# Patient Record
Sex: MALE | Race: WHITE | NOT HISPANIC OR LATINO | Employment: OTHER | RURAL
[De-identification: names, ages, dates, MRNs, and addresses within clinical notes are randomized per-mention and may not be internally consistent; named-entity substitution may affect disease eponyms.]

---

## 2020-07-22 ENCOUNTER — HISTORICAL (OUTPATIENT)
Dept: ADMINISTRATIVE | Facility: HOSPITAL | Age: 60
End: 2020-07-22

## 2020-07-22 LAB
EST. AVERAGE GLUCOSE BLD GHB EST-MCNC: 160 MG/DL
HBA1C MFR BLD HPLC: 7.4 % (ref 4.5–6.6)
UREA BREATH TEST QL: NEGATIVE

## 2021-01-14 ENCOUNTER — HISTORICAL (OUTPATIENT)
Dept: ADMINISTRATIVE | Facility: HOSPITAL | Age: 61
End: 2021-01-14

## 2022-02-10 ENCOUNTER — HOSPITAL ENCOUNTER (EMERGENCY)
Facility: HOSPITAL | Age: 62
Discharge: HOME | End: 2022-02-10
Attending: EMERGENCY MEDICINE
Payer: COMMERCIAL

## 2022-02-10 VITALS
OXYGEN SATURATION: 95 % | HEIGHT: 68 IN | RESPIRATION RATE: 11 BRPM | BODY MASS INDEX: 30.62 KG/M2 | HEART RATE: 70 BPM | WEIGHT: 202 LBS | SYSTOLIC BLOOD PRESSURE: 103 MMHG | DIASTOLIC BLOOD PRESSURE: 67 MMHG | TEMPERATURE: 99 F

## 2022-02-10 DIAGNOSIS — R06.02 SHORTNESS OF BREATH: ICD-10-CM

## 2022-02-10 DIAGNOSIS — K80.21 CALCULUS OF GALLBLADDER WITH BILIARY OBSTRUCTION BUT WITHOUT CHOLECYSTITIS: Primary | ICD-10-CM

## 2022-02-10 DIAGNOSIS — R07.9 CHEST PAIN: ICD-10-CM

## 2022-02-10 LAB
ALBUMIN SERPL BCP-MCNC: 4.4 G/DL (ref 3.5–5)
ALBUMIN/GLOB SERPL: 1.1 {RATIO}
ALP SERPL-CCNC: 115 U/L (ref 45–115)
ALT SERPL W P-5'-P-CCNC: 125 U/L (ref 16–61)
AMYLASE SERPL-CCNC: 66 U/L (ref 25–115)
ANION GAP SERPL CALCULATED.3IONS-SCNC: 16 MMOL/L (ref 7–16)
AST SERPL W P-5'-P-CCNC: 47 U/L (ref 15–37)
BASOPHILS # BLD AUTO: 0.03 K/UL (ref 0–0.2)
BASOPHILS NFR BLD AUTO: 0.4 % (ref 0–1)
BILIRUB SERPL-MCNC: 2.3 MG/DL (ref 0–1.2)
BUN SERPL-MCNC: 18 MG/DL (ref 7–18)
BUN/CREAT SERPL: 17 (ref 6–20)
CALCIUM SERPL-MCNC: 9.6 MG/DL (ref 8.5–10.1)
CHLORIDE SERPL-SCNC: 103 MMOL/L (ref 98–107)
CO2 SERPL-SCNC: 23 MMOL/L (ref 21–32)
CREAT SERPL-MCNC: 1.07 MG/DL (ref 0.7–1.3)
D DIMER PPP FEU-MCNC: <0.27 ΜG/ML (ref 0–0.47)
DIFFERENTIAL METHOD BLD: ABNORMAL
EOSINOPHIL # BLD AUTO: 0.01 K/UL (ref 0–0.5)
EOSINOPHIL NFR BLD AUTO: 0.1 % (ref 1–4)
ERYTHROCYTE [DISTWIDTH] IN BLOOD BY AUTOMATED COUNT: 13.1 % (ref 11.5–14.5)
GLOBULIN SER-MCNC: 4.1 G/DL (ref 2–4)
GLUCOSE SERPL-MCNC: 125 MG/DL (ref 74–106)
HCT VFR BLD AUTO: 48.2 % (ref 40–54)
HGB BLD-MCNC: 16.8 G/DL (ref 13.5–18)
IMM GRANULOCYTES # BLD AUTO: 0.03 K/UL (ref 0–0.04)
IMM GRANULOCYTES NFR BLD: 0.4 % (ref 0–0.4)
LIPASE SERPL-CCNC: 156 U/L (ref 73–393)
LYMPHOCYTES # BLD AUTO: 1.11 K/UL (ref 1–4.8)
LYMPHOCYTES NFR BLD AUTO: 14 % (ref 27–41)
MCH RBC QN AUTO: 30.7 PG (ref 27–31)
MCHC RBC AUTO-ENTMCNC: 34.9 G/DL (ref 32–36)
MCV RBC AUTO: 88 FL (ref 80–96)
MONOCYTES # BLD AUTO: 0.39 K/UL (ref 0–0.8)
MONOCYTES NFR BLD AUTO: 4.9 % (ref 2–6)
MPC BLD CALC-MCNC: 9.3 FL (ref 9.4–12.4)
NEUTROPHILS # BLD AUTO: 6.36 K/UL (ref 1.8–7.7)
NEUTROPHILS NFR BLD AUTO: 80.2 % (ref 53–65)
NRBC # BLD AUTO: 0 X10E3/UL
NRBC, AUTO (.00): 0 %
NT-PROBNP SERPL-MCNC: 35 PG/ML (ref 1–125)
PLATELET # BLD AUTO: 180 K/UL (ref 150–400)
POTASSIUM SERPL-SCNC: 4.1 MMOL/L (ref 3.5–5.1)
PROT SERPL-MCNC: 8.5 G/DL (ref 6.4–8.2)
RBC # BLD AUTO: 5.48 M/UL (ref 4.6–6.2)
SODIUM SERPL-SCNC: 138 MMOL/L (ref 136–145)
TROPONIN I SERPL HS-MCNC: 8.5 PG/ML
TROPONIN I SERPL HS-MCNC: 9.2 PG/ML
WBC # BLD AUTO: 7.93 K/UL (ref 4.5–11)

## 2022-02-10 PROCEDURE — 99284 EMERGENCY DEPT VISIT MOD MDM: CPT | Mod: ,,, | Performed by: EMERGENCY MEDICINE

## 2022-02-10 PROCEDURE — 93010 EKG 12-LEAD: ICD-10-PCS | Mod: ,,, | Performed by: INTERNAL MEDICINE

## 2022-02-10 PROCEDURE — 96374 THER/PROPH/DIAG INJ IV PUSH: CPT

## 2022-02-10 PROCEDURE — 25000003 PHARM REV CODE 250

## 2022-02-10 PROCEDURE — 93005 ELECTROCARDIOGRAM TRACING: CPT

## 2022-02-10 PROCEDURE — 99285 EMERGENCY DEPT VISIT HI MDM: CPT | Mod: 25

## 2022-02-10 PROCEDURE — 84075 ASSAY ALKALINE PHOSPHATASE: CPT

## 2022-02-10 PROCEDURE — 84484 ASSAY OF TROPONIN QUANT: CPT

## 2022-02-10 PROCEDURE — 80053 COMPREHEN METABOLIC PANEL: CPT

## 2022-02-10 PROCEDURE — 82040 ASSAY OF SERUM ALBUMIN: CPT

## 2022-02-10 PROCEDURE — 99284 PR EMERGENCY DEPT VISIT,LEVEL IV: ICD-10-PCS | Mod: ,,, | Performed by: EMERGENCY MEDICINE

## 2022-02-10 PROCEDURE — 93010 ELECTROCARDIOGRAM REPORT: CPT | Mod: ,,, | Performed by: INTERNAL MEDICINE

## 2022-02-10 PROCEDURE — 85025 COMPLETE CBC W/AUTO DIFF WBC: CPT

## 2022-02-10 PROCEDURE — 83690 ASSAY OF LIPASE: CPT

## 2022-02-10 PROCEDURE — C9113 INJ PANTOPRAZOLE SODIUM, VIA: HCPCS | Performed by: EMERGENCY MEDICINE

## 2022-02-10 PROCEDURE — 36415 COLL VENOUS BLD VENIPUNCTURE: CPT

## 2022-02-10 PROCEDURE — 82150 ASSAY OF AMYLASE: CPT

## 2022-02-10 PROCEDURE — 63600175 PHARM REV CODE 636 W HCPCS: Performed by: EMERGENCY MEDICINE

## 2022-02-10 PROCEDURE — 83880 ASSAY OF NATRIURETIC PEPTIDE: CPT

## 2022-02-10 PROCEDURE — 85378 FIBRIN DEGRADE SEMIQUANT: CPT

## 2022-02-10 RX ORDER — ASPIRIN 325 MG
TABLET ORAL
Status: COMPLETED
Start: 2022-02-10 | End: 2022-02-10

## 2022-02-10 RX ORDER — MAG HYDROX/ALUMINUM HYD/SIMETH 200-200-20
30 SUSPENSION, ORAL (FINAL DOSE FORM) ORAL ONCE
Status: COMPLETED | OUTPATIENT
Start: 2022-02-10 | End: 2022-02-10

## 2022-02-10 RX ORDER — ASPIRIN 325 MG
325 TABLET ORAL
Status: COMPLETED | OUTPATIENT
Start: 2022-02-10 | End: 2022-02-10

## 2022-02-10 RX ORDER — PANTOPRAZOLE SODIUM 40 MG/10ML
40 INJECTION, POWDER, LYOPHILIZED, FOR SOLUTION INTRAVENOUS
Status: COMPLETED | OUTPATIENT
Start: 2022-02-10 | End: 2022-02-10

## 2022-02-10 RX ORDER — LIDOCAINE HYDROCHLORIDE 20 MG/ML
10 SOLUTION OROPHARYNGEAL ONCE
Status: COMPLETED | OUTPATIENT
Start: 2022-02-10 | End: 2022-02-10

## 2022-02-10 RX ADMIN — LIDOCAINE HYDROCHLORIDE 10 ML: 20 SOLUTION ORAL; TOPICAL at 12:02

## 2022-02-10 RX ADMIN — Medication 325 MG: at 10:02

## 2022-02-10 RX ADMIN — ALUMINUM HYDROXIDE, MAGNESIUM HYDROXIDE, AND SIMETHICONE 30 ML: 200; 200; 20 SUSPENSION ORAL at 12:02

## 2022-02-10 RX ADMIN — ASPIRIN 325 MG ORAL TABLET 325 MG: 325 PILL ORAL at 10:02

## 2022-02-10 RX ADMIN — PANTOPRAZOLE SODIUM 40 MG: 40 INJECTION, POWDER, FOR SOLUTION INTRAVENOUS at 11:02

## 2022-02-10 NOTE — Clinical Note
"Jenaro Machado"Kj was seen and treated in our emergency department on 2/10/2022.  He may return to work on 02/11/2022.       If you have any questions or concerns, please don't hesitate to call.      Albert Covarrubias MD"

## 2022-02-10 NOTE — ED PROVIDER NOTES
"Encounter Date: 2/10/2022       History     Chief Complaint   Patient presents with    Chest Pain     Pt is a 60 yo male who presented today to [facility] ED with a cc of "chest pain." He states that this originally began about 1 month ago but has progressively worsened with the worst episode being last night. The most recent episode occurred while sitting in his recliner last night around 10pm. He describes that pain as a sharp, stabbing pain that he rates at a 9/10. He describes numbness in BUE with radiation to the fingertips bilaterally. The pain is not associated with any known provocation or palliation. He states that he has never experienced pain like this prior.         Review of patient's allergies indicates:  No Known Allergies  Past Medical History:   Diagnosis Date    Hypertension      History reviewed. No pertinent surgical history.  History reviewed. No pertinent family history.  Social History     Tobacco Use    Smoking status: Never Smoker    Smokeless tobacco: Never Used   Substance Use Topics    Alcohol use: Never    Drug use: Never     Review of Systems   Constitutional: Negative for appetite change, chills, diaphoresis, fatigue and fever.   Respiratory: Positive for chest tightness and shortness of breath. Negative for cough and wheezing.    Cardiovascular: Positive for chest pain. Negative for palpitations and leg swelling.   Gastrointestinal: Negative for abdominal distention, abdominal pain, constipation, diarrhea, nausea and vomiting.   Neurological: Positive for numbness. Negative for dizziness, syncope, weakness, light-headedness and headaches.   All other systems reviewed and are negative.      Physical Exam     Initial Vitals [02/10/22 1016]   BP Pulse Resp Temp SpO2   (!) 175/117 101 18 98.7 °F (37.1 °C) 99 %      MAP       --         Physical Exam    Nursing note and vitals reviewed.  Constitutional: He appears well-developed and well-nourished.   HENT:   Head: Normocephalic and " atraumatic.   Eyes: Conjunctivae and EOM are normal. Pupils are equal, round, and reactive to light. No scleral icterus.   Neck: Neck supple.   Normal range of motion.  Cardiovascular: Normal rate, regular rhythm and normal heart sounds. Exam reveals no gallop and no friction rub.    No murmur heard.  Pulmonary/Chest: Breath sounds normal. No respiratory distress. He has no wheezes. He has no rhonchi. He has no rales. He exhibits tenderness.   Abdominal: Abdomen is soft. Bowel sounds are normal. He exhibits no distension. There is no abdominal tenderness. There is no guarding.   Musculoskeletal:         General: No edema. Normal range of motion.      Cervical back: Normal range of motion and neck supple.     Neurological: He is alert and oriented to person, place, and time.   Skin: Skin is warm and dry. Capillary refill takes less than 2 seconds.   Psychiatric: He has a normal mood and affect. His behavior is normal. Judgment and thought content normal.         Medical Screening Exam   See Full Note    ED Course   Procedures  Labs Reviewed   COMPREHENSIVE METABOLIC PANEL - Abnormal; Notable for the following components:       Result Value    Glucose 125 (*)     Total Protein 8.5 (*)     Globulin 4.1 (*)     Bilirubin, Total 2.3 (*)      (*)     AST 47 (*)     All other components within normal limits   CBC WITH DIFFERENTIAL - Abnormal; Notable for the following components:    MPV 9.3 (*)     Neutrophils % 80.2 (*)     Lymphocytes % 14.0 (*)     Eosinophils % 0.1 (*)     All other components within normal limits   TROPONIN I - Normal   TROPONIN I - Normal   NT-PRO NATRIURETIC PEPTIDE - Normal   LIPASE - Normal   AMYLASE - Normal   D DIMER, QUANTITATIVE - Normal   CBC W/ AUTO DIFFERENTIAL    Narrative:     The following orders were created for panel order CBC auto differential.  Procedure                               Abnormality         Status                     ---------                                -----------         ------                     CBC with Differential[[id number]]        Abnormal            Final result                 Please view results for these tests on the individual orders.   EXTRA TUBES    Narrative:     The following orders were created for panel order EXTRA TUBES.  Procedure                               Abnormality         Status                     ---------                               -----------         ------                     Light Blue Top Hold[[id number]]                              In process                 Gold Top Hold[[id number]]                                    In process                   Please view results for these tests on the individual orders.   LIGHT BLUE TOP HOLD   GOLD TOP HOLD        ECG Results          EKG 12-lead (Final result)  Result time 02/10/22 13:38:50    Final result by Interface, Lab In [facility] (02/10/22 13:38:50)                 Narrative:    Test Reason : R07.9,    Vent. Rate : 064 BPM     Atrial Rate : 000 BPM     P-R Int : 216 ms          QRS Dur : 116 ms      QT Int : 430 ms       P-R-T Axes : 026 -58 067 degrees     QTc Int : 437 ms    Sinus rhythm  with borderline 1st degree A-V block  Left anterior fascicular block  Possible lateral infarct - age undetermined  Possible right ventricular hypertrophy  Abnormal ECG    Confirmed by Mandeep GUPTA, Shar ([id number]) on 2/10/2022 1:38:44 PM    Referred By: [email or web address]   SELF           Confirmed By:Shar Kaufman MD                            Imaging Results          MRI MRCP Without Contrast (Final result)  Result time 02/10/22 16:00:25    Final result by Christoph Maya MD (02/10/22 16:00:25)                 Impression:      Cholelithiasis    No evidence to suggest biliary obstruction    No acute process otherwise      Electronically signed by: Christoph Maya  Date:    02/10/2022  Time:    16:00             Narrative:    EXAMINATION:  MRI ABDOMEN WITHOUT CONTRAST MRCP    CLINICAL  HISTORY:  Cholelithiasis;.    Elevated bilirubin    COMPARISON:  Noncontrast CT abdomen February 10, 2022    TECHNIQUE:  The abdomen was imaged in the multiple planes on the 1.5 nancy magnet without IV contrast, to include T2, T1, and diffusion sequences.  MRCP was also performed.  In addition to source images, 3D multi planar reconstruction MRCP images were also generated and archived.    FINDINGS:  Numerous nodular hypointense T2 signal gallstones are noted in the gallbladder lumen.  There is no gallbladder wall thickening or abnormal pericholecystic fluid.  Common bile duct measures a normal 4 mm.  Distal common bile duct tapers smoothly.  There is no choledochal stone.  There is no malignant-appearing biliary stricture.  There is no intrahepatic biliary ductal dilatation.  There is no gross abnormality of the normal caliber pancreatic duct.    Liver, spleen, pancreas, bile ducts, and adrenal glands appear normal.  Multiple scattered punctate rounded fluid signal intensity renal cortical cysts are present, all measuring less than 5 mm.  There is no obvious solid renal mass.  There is no hydronephrosis.    Borderline prominent 10 mm portal caval lymph node and chayo hepatis node are noted.  There is no charmaine lymphadenopathy by short-axis diameter criteria.    There is no charmaine bowel obstruction where seen.    There is no aneurysm of the abdominal aorta.                               CT Abdomen Pelvis  Without Contrast (Final result)  Result time 02/10/22 13:31:52    Final result by Roc Menendez II, MD (02/10/22 13:31:52)                 Impression:      Cholelithiasis.  No other evidence of significant abnormality demonstrated      Electronically signed by: Roc Menendez  Date:    02/10/2022  Time:    13:31             Narrative:    EXAMINATION:  CT ABDOMEN PELVIS WITHOUT CONTRAST    CLINICAL HISTORY:  Gall Stones, RUQ pain;    TECHNIQUE:  Axial CT imaging of the abdomen and pelvis is performed without  contrast.    CT dose reduction technique used - Dose Rite and tube current modulation.    COMPARISON:  None available    FINDINGS:  Cardiac and lung bases are within normal limits    CT abdomen: The gallbladder has multiple calculi present liver, spleen, pancreas and adrenal glands are normal in size and density.  No evidence of focal lesion is demonstrated in these solid organs.    Kidneys are normal in size and density.  No evidence of hydronephrosis or nephrolithiasis is seen.    The bowel caliber is normal and no wall thickening or adjacent inflammatory change is seen.  No evidence of free fluid or free air is present.  Appendix is not identified.    CT pelvis: The bowel and bladder appear within normal limits.  The pelvic organs show no evidence of abnormality                               US Abdomen Complete (Final result)  Result time 02/10/22 12:36:47    Final result by Roc Menendez II, MD (02/10/22 12:36:47)                 Impression:      Cholelithiasis.  No other evidence of abnormality demonstrated.    Ultrasound images stored and captured.      Electronically signed by: Roc Menendez  Date:    02/10/2022  Time:    12:36             Narrative:    EXAMINATION:  US ABDOMEN COMPLETE    CLINICAL HISTORY:  elevated liver enzymes;Abdominal Pain    TECHNIQUE:  Grayscale and color Doppler imaging performed    COMPARISON:  None available    FINDINGS:  The liver is normal in size and echogenicity. The gallbladder has multiple calculi including some in the gallbladder neck which does not move during the study.  The gallbladder wall thickness is 2.4 mm. The common bile duct measures 4.5 mm.    The visualized portion of the pancreas appear within normal limits    The kidneys are normal in size and echogenicity without hydronephrosis or other abnormality. The right renal length is 9.6 cm. Left renal length is 69.9 cm.    Spleen, aorta and IVC appear within normal limits. No free fluid or free air seen.                                X-Ray Chest AP Portable (Final result)  Result time 02/10/22 11:26:57    Final result by Roc Menendez II, MD (02/10/22 11:26:57)                 Impression:      No evidence of cardiopulmonary disease.      Electronically signed by: Roc Menendez  Date:    02/10/2022  Time:    11:26             Narrative:    EXAMINATION:  XR CHEST AP PORTABLE    CLINICAL HISTORY:  Shortness of breath    COMPARISON:  14 January 2021    FINDINGS:  The heart and mediastinum are normal in size and configuration.  The pulmonary vascularity is normal in caliber.  No lung infiltrates, effusions, pneumothorax or other abnormality is demonstrated.                                 Medications   aspirin tablet 325 mg (325 mg Oral Given 2/10/22 1047)   pantoprazole injection 40 mg (40 mg Intravenous Given 2/10/22 1148)   aluminum-magnesium hydroxide-simethicone 200-200-20 mg/5 mL suspension 30 mL (30 mLs Oral Given 2/10/22 1252)     And   LIDOcaine HCl 2% oral solution 10 mL (10 mLs Oral Given 2/10/22 1252)     Medical Decision Making:   ED Management:  MRCP negative.  Dr. Melton will see in clinic Monday.            Attending Attestation:   Physician Attestation Statement for Resident:  As the supervising MD   Physician Attestation Statement: I have personally seen and examined this patient.   I agree with the above history. -:   As the supervising MD I agree with the above PE.    As the supervising MD I agree with the above treatment, course, plan, and disposition.                ED Course as of 02/10/22 1727   Thu Feb 10, 2022   1142 Patient evaluated and managed by attending emergency physician and by resident physician.  Patient presents with epigastric/lower sternal chest discomfort since yesterday.  Patient says that he has had a sour taste in some belching decreased appetite over the past day.  He has been told the past that he has had gallstones but never has had a cholecystectomy.  Does not have  postprandial pain.  Patient was diagnosed with COVID 19 2 weeks ago.  His only symptom was fatigue.  There has been no associated vomiting or diarrhea.  Patient has been on Nexium chronically but feels like it is stopped working.  No prior cardiac workup was.  No associated exertional chest pain no associated diaphoresis.  Physical exam done at 11:35 a.m. shows patient is nontoxic appearing awake and alert normal heart lung sounds.  Moderate epigastric tenderness.  EKG shows no acute ischemic changes.  Troponin is normal with onset of symptoms being yesterday and also morbid GI description of his symptoms negative assess due troponin this time wheals out myocardial infarction.  Bilirubin 2.3 alkaline phosphatase normal transaminases slightly elevated.  Will obtain ultrasound right upper quadrant. [PK]   1415 GI on-call recommends an MRCP done in the ER [PK]      ED Course User Index  [PK] Cristóbal Echevarria MD          Clinical Impression:   Final diagnoses:  [K80.21] Calculus of gallbladder with biliary obstruction but without cholecystitis (Primary)         ED Disposition Condition    Discharge Stable        ED Prescriptions     None        Follow-up Information    None          Albert Covarrubias MD  Resident  02/10/22 1126       Cristóbal Echevarria MD  02/10/22 8870

## 2022-02-22 ENCOUNTER — OFFICE VISIT (OUTPATIENT)
Dept: SURGERY | Facility: CLINIC | Age: 62
End: 2022-02-22
Attending: STUDENT IN AN ORGANIZED HEALTH CARE EDUCATION/TRAINING PROGRAM
Payer: COMMERCIAL

## 2022-02-22 DIAGNOSIS — Z01.812 PRE-PROCEDURAL LABORATORY EXAMINATION: Primary | ICD-10-CM

## 2022-02-22 DIAGNOSIS — K80.20 SYMPTOMATIC CHOLELITHIASIS: ICD-10-CM

## 2022-02-22 PROCEDURE — 99204 PR OFFICE/OUTPT VISIT, NEW, LEVL IV, 45-59 MIN: ICD-10-PCS | Mod: S$PBB,,, | Performed by: STUDENT IN AN ORGANIZED HEALTH CARE EDUCATION/TRAINING PROGRAM

## 2022-02-22 PROCEDURE — 99204 OFFICE O/P NEW MOD 45 MIN: CPT | Mod: S$PBB,,, | Performed by: STUDENT IN AN ORGANIZED HEALTH CARE EDUCATION/TRAINING PROGRAM

## 2022-02-22 PROCEDURE — 1159F PR MEDICATION LIST DOCUMENTED IN MEDICAL RECORD: ICD-10-PCS | Mod: ,,, | Performed by: STUDENT IN AN ORGANIZED HEALTH CARE EDUCATION/TRAINING PROGRAM

## 2022-02-22 PROCEDURE — 99213 OFFICE O/P EST LOW 20 MIN: CPT | Mod: PBBFAC | Performed by: STUDENT IN AN ORGANIZED HEALTH CARE EDUCATION/TRAINING PROGRAM

## 2022-02-22 PROCEDURE — 1159F MED LIST DOCD IN RCRD: CPT | Mod: ,,, | Performed by: STUDENT IN AN ORGANIZED HEALTH CARE EDUCATION/TRAINING PROGRAM

## 2022-02-22 RX ORDER — SODIUM CHLORIDE 9 MG/ML
INJECTION, SOLUTION INTRAVENOUS CONTINUOUS
Status: CANCELLED | OUTPATIENT
Start: 2022-02-22

## 2022-02-22 NOTE — PATIENT INSTRUCTIONS
[facility]                                                                                                                                                                                                                                                                                                                                                                                                                                                                         Preoperative Instructions        Your pre-op lab work and Covid testing will be on 2/22/2022 on the 1st floor of the [facility] building.                                                                                Day of Surgery      Your surgery is scheduled for 2/24/2022 at [facility] on the ground floor of the Ambulatory building.     Your arrival time is 0700.              DO NOT EAT OR DRINK ANYTHING AFTER MIDNIGHT.          You may take blood pressure medication with a small drink of water the morning of surgery.                                 DO NOT TAKE INSULIN OR ANY OTHER BLOOD SUGAR MEDICATIONS.           The following blood sugar medications have to be stopped 48 hours prior to surgery:                    Metformin        Glucovance          Metaglip             Fortamet           Glucophage                   Riomet             Avandamet          Glimepiride              IF YOU ARE ON ANY OF THESE BLOOD THINNERS, MAKE SURE YOUR PHYSICIAN IS AWARE.                Eliquis/Apixaban             Xarelto/Rivaroxaban             Plavix/Clopidogrel                  Wafarin/Coumadin,Jantoven           Pletal/Cilostazol              Pradaxa/Dibigatran                           PLEASE USE CHLORHEXIDINE WASH THE NIGHT BEFORE SURGERY AND THE  MORNING OF SURGERY.             YOU CANNOT DRIVE YOURSELF HOME FROM THE HOSPITAL THE DAY OF SURGERY.        Please have a  with you.           Bring all medications, that you are currently taking, with you to the hospital the morning of your procedure.           Please leave all valuables at home.          Children under the age of 18 must be accompanied by an adult.          PLEASE UNDERSTAND THAT OUR OFFICE DOES NOT GIVE PATHOLOGY RESULTS OR TEST RESULTS OVER THE PHONE.         THIS WILL BE DISCUSSED WITH YOU ON YOUR FOLLOW UP APPOINTMENT TO DISCUSS IN PERSON.

## 2022-02-22 NOTE — H&P (VIEW-ONLY)
History & Physical    SUBJECTIVE:     History of Present Illness:  61-year-old  male presents to the clinic for evaluation for abdominal pain.  Patient states his known history of gallstones.  Patient has had increasing abdominal pain, nausea and vomiting this past Monday which progressively worsened.  Patient went to the ER was found to have gallstones in the neck of the gallbladder with some elevated liver enzymes.  MRCP was performed which showed no choledocholithiasis.  Patient was discharged to follow-up and my clinic.  Patient still has occasional abdominal pain and once gallbladder removed.  Patient the significant past surgical history of umbilical hernia repair with mesh approximately 10-15 years ago and hemorrhoidectomy.  Patient has never had a colonoscopy.  Denies any chest pain/shortness of breath, nausea/vomiting/diarrhea, fever/chills.    Chief Complaint   Patient presents with    Gall Bladder Problem     States he has been having some problems for about 2 years       Review of patient's allergies indicates:  No Known Allergies    No current outpatient medications on file.     No current facility-administered medications for this visit.       Past Medical History:   Diagnosis Date    Hypertension      Past Surgical History:   Procedure Laterality Date    HERNIA REPAIR      umbilical    KNEE SURGERY       History reviewed. No pertinent family history.  Social History     Tobacco Use    Smoking status: Never Smoker    Smokeless tobacco: Never Used   Substance Use Topics    Alcohol use: Never    Drug use: Never        Review of Systems:  Review of Systems   Constitutional: Negative.  Negative for appetite change, chills, fever and unexpected weight change.   HENT: Negative.  Negative for trouble swallowing.    Eyes: Negative.    Respiratory: Negative.  Negative for chest tightness and shortness of breath.    Cardiovascular: Negative.  Negative for chest pain.   Gastrointestinal:  Negative.  Negative for abdominal distention, abdominal pain, blood in stool and nausea.   Endocrine: Negative.    Genitourinary: Negative.  Negative for hematuria.   Musculoskeletal: Negative.  Negative for back pain and myalgias.   Skin: Negative.  Negative for color change.   Allergic/Immunologic: Negative.    Neurological: Negative.  Negative for dizziness, syncope, weakness and light-headedness.   Psychiatric/Behavioral: Negative.  Negative for agitation.       OBJECTIVE:     Vital Signs (Most Recent)              Physical Exam:  Physical Exam  Constitutional:       General: He is not in acute distress.     Appearance: Normal appearance.   HENT:      Head: Normocephalic.      Nose: Nose normal.   Eyes:      Pupils: Pupils are equal, round, and reactive to light.   Cardiovascular:      Rate and Rhythm: Normal rate.   Pulmonary:      Effort: Pulmonary effort is normal. No respiratory distress.   Abdominal:      Palpations: Abdomen is soft.      Tenderness: There is no abdominal tenderness. There is no guarding or rebound.   Musculoskeletal:         General: Normal range of motion.      Cervical back: Normal range of motion.   Skin:     General: Skin is warm.      Coloration: Skin is not jaundiced.   Neurological:      General: No focal deficit present.      Mental Status: He is alert and oriented to person, place, and time.      Cranial Nerves: No cranial nerve deficit.   Psychiatric:         Mood and Affect: Mood normal.         ASSESSMENT/PLAN:     Symptomatic cholelithiasis    PLAN:Plan     To the OR for laparoscopic cholecystectomy.    Risks and benefits explained with the patient including risks for infection, bleeding, injury to surrounding structures, hematoma/seroma formation with need for possible evacuation, possible open.  The patient verbalized understanding, agrees and wishes to proceed with surgery.    Patient will need colonoscopy to be set up after his postoperative follow-up

## 2022-02-23 RX ORDER — DOXYCYCLINE 100 MG/1
CAPSULE ORAL
Status: ON HOLD | COMMUNITY
Start: 2022-01-31 | End: 2022-02-24 | Stop reason: HOSPADM

## 2022-02-23 RX ORDER — PREDNISONE 10 MG/1
TABLET ORAL
Status: ON HOLD | COMMUNITY
Start: 2022-01-31 | End: 2022-02-24 | Stop reason: HOSPADM

## 2022-02-23 RX ORDER — BUDESONIDE 0.5 MG/2ML
INHALANT ORAL
COMMUNITY
Start: 2022-01-31 | End: 2022-02-24

## 2022-02-23 RX ORDER — AZITHROMYCIN 250 MG/1
TABLET, FILM COATED ORAL
Status: ON HOLD | COMMUNITY
Start: 2022-01-26 | End: 2022-02-24 | Stop reason: HOSPADM

## 2022-02-23 RX ORDER — ALBUTEROL SULFATE 0.83 MG/ML
SOLUTION RESPIRATORY (INHALATION)
COMMUNITY
Start: 2022-01-31 | End: 2023-06-25 | Stop reason: CLARIF

## 2022-02-24 ENCOUNTER — HOSPITAL ENCOUNTER (OUTPATIENT)
Facility: HOSPITAL | Age: 62
Discharge: HOME | End: 2022-02-24
Attending: STUDENT IN AN ORGANIZED HEALTH CARE EDUCATION/TRAINING PROGRAM | Admitting: STUDENT IN AN ORGANIZED HEALTH CARE EDUCATION/TRAINING PROGRAM
Payer: COMMERCIAL

## 2022-02-24 ENCOUNTER — ANESTHESIA (OUTPATIENT)
Dept: SURGERY | Facility: HOSPITAL | Age: 62
End: 2022-02-24
Payer: COMMERCIAL

## 2022-02-24 ENCOUNTER — ANESTHESIA EVENT (OUTPATIENT)
Dept: SURGERY | Facility: HOSPITAL | Age: 62
End: 2022-02-24
Payer: COMMERCIAL

## 2022-02-24 VITALS
TEMPERATURE: 98 F | HEART RATE: 68 BPM | SYSTOLIC BLOOD PRESSURE: 120 MMHG | DIASTOLIC BLOOD PRESSURE: 84 MMHG | BODY MASS INDEX: 29.4 KG/M2 | RESPIRATION RATE: 18 BRPM | OXYGEN SATURATION: 95 % | HEIGHT: 68 IN | WEIGHT: 194 LBS

## 2022-02-24 DIAGNOSIS — Z01.812 PRE-PROCEDURAL LABORATORY EXAMINATION: ICD-10-CM

## 2022-02-24 DIAGNOSIS — K80.20 SYMPTOMATIC CHOLELITHIASIS: ICD-10-CM

## 2022-02-24 PROCEDURE — D9220A PRA ANESTHESIA: Mod: ,,, | Performed by: ANESTHESIOLOGY

## 2022-02-24 PROCEDURE — 27000716 HC OXISENSOR PROBE, ANY SIZE: Performed by: ANESTHESIOLOGY

## 2022-02-24 PROCEDURE — 27000689 HC BLADE LARYNGOSCOPE ANY SIZE: Performed by: ANESTHESIOLOGY

## 2022-02-24 PROCEDURE — 25000003 PHARM REV CODE 250: Performed by: NURSE ANESTHETIST, CERTIFIED REGISTERED

## 2022-02-24 PROCEDURE — 27000165 HC TUBE, ETT CUFFED: Performed by: ANESTHESIOLOGY

## 2022-02-24 PROCEDURE — 37000008 HC ANESTHESIA 1ST 15 MINUTES: Performed by: STUDENT IN AN ORGANIZED HEALTH CARE EDUCATION/TRAINING PROGRAM

## 2022-02-24 PROCEDURE — D9220A PRA ANESTHESIA: ICD-10-PCS | Mod: ,,, | Performed by: NURSE ANESTHETIST, CERTIFIED REGISTERED

## 2022-02-24 PROCEDURE — 71000016 HC POSTOP RECOV ADDL HR: Performed by: STUDENT IN AN ORGANIZED HEALTH CARE EDUCATION/TRAINING PROGRAM

## 2022-02-24 PROCEDURE — 25000003 PHARM REV CODE 250: Performed by: ANESTHESIOLOGY

## 2022-02-24 PROCEDURE — 27000510 HC BLANKET BAIR HUGGER ANY SIZE: Performed by: ANESTHESIOLOGY

## 2022-02-24 PROCEDURE — 25000003 PHARM REV CODE 250: Performed by: STUDENT IN AN ORGANIZED HEALTH CARE EDUCATION/TRAINING PROGRAM

## 2022-02-24 PROCEDURE — 47562 PR LAP,CHOLECYSTECTOMY: ICD-10-PCS | Mod: 22,,, | Performed by: STUDENT IN AN ORGANIZED HEALTH CARE EDUCATION/TRAINING PROGRAM

## 2022-02-24 PROCEDURE — 47562 LAPAROSCOPIC CHOLECYSTECTOMY: CPT | Mod: 22,,, | Performed by: STUDENT IN AN ORGANIZED HEALTH CARE EDUCATION/TRAINING PROGRAM

## 2022-02-24 PROCEDURE — 88304 TISSUE EXAM BY PATHOLOGIST: CPT | Mod: SUR | Performed by: STUDENT IN AN ORGANIZED HEALTH CARE EDUCATION/TRAINING PROGRAM

## 2022-02-24 PROCEDURE — 36000709 HC OR TIME LEV III EA ADD 15 MIN: Performed by: STUDENT IN AN ORGANIZED HEALTH CARE EDUCATION/TRAINING PROGRAM

## 2022-02-24 PROCEDURE — 27000655: Performed by: ANESTHESIOLOGY

## 2022-02-24 PROCEDURE — 27000260 *HC AIRWAY ORAL: Performed by: ANESTHESIOLOGY

## 2022-02-24 PROCEDURE — 27201423 OPTIME MED/SURG SUP & DEVICES STERILE SUPPLY: Performed by: STUDENT IN AN ORGANIZED HEALTH CARE EDUCATION/TRAINING PROGRAM

## 2022-02-24 PROCEDURE — D9220A PRA ANESTHESIA: ICD-10-PCS | Mod: ,,, | Performed by: ANESTHESIOLOGY

## 2022-02-24 PROCEDURE — D9220A PRA ANESTHESIA: Mod: ,,, | Performed by: NURSE ANESTHETIST, CERTIFIED REGISTERED

## 2022-02-24 PROCEDURE — 37000009 HC ANESTHESIA EA ADD 15 MINS: Performed by: STUDENT IN AN ORGANIZED HEALTH CARE EDUCATION/TRAINING PROGRAM

## 2022-02-24 PROCEDURE — 36000708 HC OR TIME LEV III 1ST 15 MIN: Performed by: STUDENT IN AN ORGANIZED HEALTH CARE EDUCATION/TRAINING PROGRAM

## 2022-02-24 PROCEDURE — 71000033 HC RECOVERY, INTIAL HOUR: Performed by: STUDENT IN AN ORGANIZED HEALTH CARE EDUCATION/TRAINING PROGRAM

## 2022-02-24 PROCEDURE — 88304 TISSUE EXAM BY PATHOLOGIST: CPT | Mod: 26,,, | Performed by: PATHOLOGY

## 2022-02-24 PROCEDURE — 71000015 HC POSTOP RECOV 1ST HR: Performed by: STUDENT IN AN ORGANIZED HEALTH CARE EDUCATION/TRAINING PROGRAM

## 2022-02-24 PROCEDURE — 63600175 PHARM REV CODE 636 W HCPCS: Performed by: ANESTHESIOLOGY

## 2022-02-24 PROCEDURE — 63600175 PHARM REV CODE 636 W HCPCS: Performed by: NURSE ANESTHETIST, CERTIFIED REGISTERED

## 2022-02-24 PROCEDURE — 88304 SURGICAL PATHOLOGY: ICD-10-PCS | Mod: 26,,, | Performed by: PATHOLOGY

## 2022-02-24 RX ORDER — DIPHENHYDRAMINE HYDROCHLORIDE 50 MG/ML
25 INJECTION INTRAMUSCULAR; INTRAVENOUS EVERY 6 HOURS PRN
Status: DISCONTINUED | OUTPATIENT
Start: 2022-02-24 | End: 2022-02-24 | Stop reason: HOSPADM

## 2022-02-24 RX ORDER — DOCUSATE SODIUM 100 MG/1
100 CAPSULE, LIQUID FILLED ORAL 2 TIMES DAILY
Qty: 28 CAPSULE | Refills: 0 | Status: SHIPPED | OUTPATIENT
Start: 2022-02-24 | End: 2023-06-25 | Stop reason: CLARIF

## 2022-02-24 RX ORDER — DEXAMETHASONE SODIUM PHOSPHATE 4 MG/ML
INJECTION, SOLUTION INTRA-ARTICULAR; INTRALESIONAL; INTRAMUSCULAR; INTRAVENOUS; SOFT TISSUE
Status: DISCONTINUED | OUTPATIENT
Start: 2022-02-24 | End: 2022-02-24

## 2022-02-24 RX ORDER — BUPIVACAINE HYDROCHLORIDE 2.5 MG/ML
INJECTION, SOLUTION EPIDURAL; INFILTRATION; INTRACAUDAL
Status: DISCONTINUED | OUTPATIENT
Start: 2022-02-24 | End: 2022-02-24 | Stop reason: HOSPADM

## 2022-02-24 RX ORDER — HYDROMORPHONE HYDROCHLORIDE 2 MG/ML
0.5 INJECTION, SOLUTION INTRAMUSCULAR; INTRAVENOUS; SUBCUTANEOUS EVERY 5 MIN PRN
Status: DISCONTINUED | OUTPATIENT
Start: 2022-02-24 | End: 2022-02-24 | Stop reason: HOSPADM

## 2022-02-24 RX ORDER — MORPHINE SULFATE 10 MG/ML
4 INJECTION INTRAMUSCULAR; INTRAVENOUS; SUBCUTANEOUS EVERY 5 MIN PRN
Status: DISCONTINUED | OUTPATIENT
Start: 2022-02-24 | End: 2022-02-24 | Stop reason: HOSPADM

## 2022-02-24 RX ORDER — CEFAZOLIN SODIUM 2 G/50ML
2 SOLUTION INTRAVENOUS
Status: DISCONTINUED | OUTPATIENT
Start: 2022-02-24 | End: 2022-02-24 | Stop reason: HOSPADM

## 2022-02-24 RX ORDER — LIDOCAINE HYDROCHLORIDE 20 MG/ML
INJECTION, SOLUTION EPIDURAL; INFILTRATION; INTRACAUDAL; PERINEURAL
Status: DISCONTINUED | OUTPATIENT
Start: 2022-02-24 | End: 2022-02-24

## 2022-02-24 RX ORDER — PROPOFOL 10 MG/ML
VIAL (ML) INTRAVENOUS
Status: DISCONTINUED | OUTPATIENT
Start: 2022-02-24 | End: 2022-02-24

## 2022-02-24 RX ORDER — SODIUM CHLORIDE 9 MG/ML
INJECTION, SOLUTION INTRAVENOUS CONTINUOUS
Status: DISCONTINUED | OUTPATIENT
Start: 2022-02-24 | End: 2022-02-24 | Stop reason: HOSPADM

## 2022-02-24 RX ORDER — HYDROCODONE BITARTRATE AND ACETAMINOPHEN 5; 325 MG/1; MG/1
1 TABLET ORAL EVERY 6 HOURS PRN
Qty: 15 TABLET | Refills: 0 | Status: SHIPPED | OUTPATIENT
Start: 2022-02-24 | End: 2022-03-03

## 2022-02-24 RX ORDER — CEFAZOLIN SODIUM 1 G/3ML
INJECTION, POWDER, FOR SOLUTION INTRAMUSCULAR; INTRAVENOUS
Status: DISCONTINUED | OUTPATIENT
Start: 2022-02-24 | End: 2022-02-24

## 2022-02-24 RX ORDER — ONDANSETRON 2 MG/ML
4 INJECTION INTRAMUSCULAR; INTRAVENOUS DAILY PRN
Status: DISCONTINUED | OUTPATIENT
Start: 2022-02-24 | End: 2022-02-24 | Stop reason: HOSPADM

## 2022-02-24 RX ORDER — LIDOCAINE HYDROCHLORIDE AND EPINEPHRINE 10; 10 MG/ML; UG/ML
INJECTION, SOLUTION INFILTRATION; PERINEURAL
Status: DISCONTINUED | OUTPATIENT
Start: 2022-02-24 | End: 2022-02-24 | Stop reason: HOSPADM

## 2022-02-24 RX ORDER — SODIUM CHLORIDE, SODIUM LACTATE, POTASSIUM CHLORIDE, CALCIUM CHLORIDE 600; 310; 30; 20 MG/100ML; MG/100ML; MG/100ML; MG/100ML
INJECTION, SOLUTION INTRAVENOUS CONTINUOUS
Status: DISCONTINUED | OUTPATIENT
Start: 2022-02-24 | End: 2022-02-24 | Stop reason: HOSPADM

## 2022-02-24 RX ORDER — SODIUM CHLORIDE, SODIUM LACTATE, POTASSIUM CHLORIDE, CALCIUM CHLORIDE 600; 310; 30; 20 MG/100ML; MG/100ML; MG/100ML; MG/100ML
125 INJECTION, SOLUTION INTRAVENOUS CONTINUOUS
Status: DISCONTINUED | OUTPATIENT
Start: 2022-02-24 | End: 2022-02-24 | Stop reason: HOSPADM

## 2022-02-24 RX ORDER — OXYCODONE HYDROCHLORIDE 5 MG/1
5 TABLET ORAL
Status: DISCONTINUED | OUTPATIENT
Start: 2022-02-24 | End: 2022-02-24 | Stop reason: HOSPADM

## 2022-02-24 RX ORDER — MEPERIDINE HYDROCHLORIDE 25 MG/ML
25 INJECTION INTRAMUSCULAR; INTRAVENOUS; SUBCUTANEOUS EVERY 10 MIN PRN
Status: DISCONTINUED | OUTPATIENT
Start: 2022-02-24 | End: 2022-02-24 | Stop reason: HOSPADM

## 2022-02-24 RX ORDER — FENTANYL CITRATE 50 UG/ML
INJECTION, SOLUTION INTRAMUSCULAR; INTRAVENOUS
Status: DISCONTINUED | OUTPATIENT
Start: 2022-02-24 | End: 2022-02-24

## 2022-02-24 RX ORDER — HYDROMORPHONE HYDROCHLORIDE 2 MG/ML
INJECTION, SOLUTION INTRAMUSCULAR; INTRAVENOUS; SUBCUTANEOUS
Status: DISCONTINUED | OUTPATIENT
Start: 2022-02-24 | End: 2022-02-24

## 2022-02-24 RX ORDER — ROCURONIUM BROMIDE 50 MG/5 ML
SYRINGE (ML) INTRAVENOUS
Status: DISCONTINUED | OUTPATIENT
Start: 2022-02-24 | End: 2022-02-24

## 2022-02-24 RX ORDER — ONDANSETRON 2 MG/ML
INJECTION INTRAMUSCULAR; INTRAVENOUS
Status: DISCONTINUED | OUTPATIENT
Start: 2022-02-24 | End: 2022-02-24

## 2022-02-24 RX ORDER — MIDAZOLAM HYDROCHLORIDE 1 MG/ML
INJECTION INTRAMUSCULAR; INTRAVENOUS
Status: DISCONTINUED | OUTPATIENT
Start: 2022-02-24 | End: 2022-02-24

## 2022-02-24 RX ADMIN — MIDAZOLAM HYDROCHLORIDE 2 MG: 1 INJECTION, SOLUTION INTRAMUSCULAR; INTRAVENOUS at 11:02

## 2022-02-24 RX ADMIN — DEXAMETHASONE SODIUM PHOSPHATE 6 MG: 4 INJECTION, SOLUTION INTRA-ARTICULAR; INTRALESIONAL; INTRAMUSCULAR; INTRAVENOUS; SOFT TISSUE at 11:02

## 2022-02-24 RX ADMIN — CEFAZOLIN 2 G: 1 INJECTION, POWDER, FOR SOLUTION INTRAMUSCULAR; INTRAVENOUS; PARENTERAL at 11:02

## 2022-02-24 RX ADMIN — OXYCODONE HYDROCHLORIDE 5 MG: 5 TABLET ORAL at 02:02

## 2022-02-24 RX ADMIN — SODIUM CHLORIDE: 9 INJECTION, SOLUTION INTRAVENOUS at 11:02

## 2022-02-24 RX ADMIN — Medication 5 MG: at 11:02

## 2022-02-24 RX ADMIN — PROPOFOL 200 MG: 10 INJECTION, EMULSION INTRAVENOUS at 11:02

## 2022-02-24 RX ADMIN — LIDOCAINE HYDROCHLORIDE 60 MG: 20 INJECTION, SOLUTION EPIDURAL; INFILTRATION; INTRACAUDAL; PERINEURAL at 11:02

## 2022-02-24 RX ADMIN — FENTANYL CITRATE 50 MCG: 50 INJECTION INTRAMUSCULAR; INTRAVENOUS at 11:02

## 2022-02-24 RX ADMIN — ONDANSETRON 4 MG: 2 INJECTION INTRAMUSCULAR; INTRAVENOUS at 03:02

## 2022-02-24 RX ADMIN — SUGAMMADEX 200 MG: 100 INJECTION, SOLUTION INTRAVENOUS at 12:02

## 2022-02-24 RX ADMIN — HYDROMORPHONE HYDROCHLORIDE 2 MG: 2 INJECTION, SOLUTION INTRAMUSCULAR; INTRAVENOUS; SUBCUTANEOUS at 12:02

## 2022-02-24 RX ADMIN — Medication 10 MG: at 12:02

## 2022-02-24 RX ADMIN — SODIUM CHLORIDE, POTASSIUM CHLORIDE, SODIUM LACTATE AND CALCIUM CHLORIDE 125 ML/HR: 600; 310; 30; 20 INJECTION, SOLUTION INTRAVENOUS at 03:02

## 2022-02-24 RX ADMIN — MORPHINE SULFATE 4 MG: 10 INJECTION INTRAVENOUS at 01:02

## 2022-02-24 RX ADMIN — ONDANSETRON 4 MG: 2 INJECTION INTRAMUSCULAR; INTRAVENOUS at 11:02

## 2022-02-24 RX ADMIN — Medication 35 MG: at 11:02

## 2022-02-24 RX ADMIN — MORPHINE SULFATE 2 MG: 10 INJECTION INTRAVENOUS at 01:02

## 2022-02-24 NOTE — ANESTHESIA POSTPROCEDURE EVALUATION
Anesthesia Post Evaluation    Patient: Jenaro Underwood    Procedure(s) Performed: Procedure(s) (LRB):  CHOLECYSTECTOMY, LAPAROSCOPIC (N/A)    Final Anesthesia Type: general      Patient location during evaluation: PACU  Patient participation: Yes- Able to Participate  Level of consciousness: awake and sedated  Post-procedure vital signs: reviewed and stable  Pain management: adequate  Airway patency: patent    PONV status at discharge: No PONV  Anesthetic complications: no      Cardiovascular status: blood pressure returned to baseline  Respiratory status: unassisted  Hydration status: euvolemic  Follow-up not needed.          Vitals Value Taken Time   /75 02/24/22 1514   Temp 36.4 °C (97.6 °F) 02/24/22 1317   Pulse 62 02/24/22 1517   Resp 16 02/24/22 1439   SpO2 97 % 02/24/22 1517   Vitals shown include unvalidated device data.      Event Time   Out of Recovery 14:00:00         Pain/Isaiah Score: Pain Rating Prior to Med Admin: 6 (2/24/2022  2:39 PM)  Pain Rating Post Med Admin: 7 (2/24/2022  1:37 PM)  Isaiah Score: 9 (2/24/2022  2:00 PM)

## 2022-02-24 NOTE — OP NOTE
[facility] - Periop Services  Surgery Department  Operative Note    SUMMARY     Date of Procedure: 2/24/2022     Procedure: Procedure(s) (LRB):  CHOLECYSTECTOMY, LAPAROSCOPIC (N/A)     Surgeon(s) and Role:     * Beto Mullen DO - Primary    Assisting Surgeon: None    Pre-Operative Diagnosis: Symptomatic cholelithiasis [K80.20]    Post-Operative Diagnosis: Post-Op Diagnosis Codes:     * Symptomatic cholelithiasis [K80.20]    Anesthesia: General    Operative Findings (including complications, if any):  Gallbladder with significant omental adhesions    Description of Technical Procedures:  Patient was taken the operating room placed on the operating table in supine position.  Patient underwent general anesthesia per the anesthesia team.  The abdomen was then prepped and draped in usual sterile fashion.  Stab incision was made in the left upper quadrant a Veress needle was inserted in the abdomen was insufflated to 15 mmHg; patient tolerated insufflation well.  5 mm trocar was then inserted just superior to the umbilicus under visualization with the laparoscoped the abdomen; no injuries identified.  An 11 mm trocar was then placed in the subxiphoid region followed by 2 5 mm trocars in the right upper quadrant; all done under visualization.  There was significant adhesions of the gallbladder and we took these adhesions down with hook cautery; this took an additional 30 minutes.  The gallbladder was intrahepatic gallbladder required extensive dissection to free up the gallbladder from the liver; this took an additional 60 minutes; I will add a 22 modifier to the case.  During dissection, the gallbladder was accidentally ripped by the grasper which spilled bile and gallstones in the abdomen.  These were suctioned and cleared by suction and grasper with stone retrieval.  We dissected out the cystic duct and the cystic artery.  We were able to see the cystic plate behind the 2 structures, thus obtaining the critical  view.  We doubly clipped, ligated and transected the duct and the artery.  We then continued and dissected the gallbladder off the liver with electrocautery.  The gallbladder was placed in Endo-Catch bag and removed from the subxiphoid port without any difficulty.  All bleeding was controlled electrocautery.  We again irrigated the abdomen and cleared all bile and stones.  The abdomen was then desufflated and trocars removed.  Skin incisions were approximated with 4-0 Monocryl deep dermal sutures.  The skin was cleaned and dried and Mastisol Steri-Strips applied.  Patient was awakened, extubated and taken the PACU in stable condition.        Estimated Blood Loss (EBL): 5 cc           Implants: * No implants in log *    Specimens:   Specimen (24h ago, onward)             Start     Ordered    02/24/22 1217  Surgical Pathology  RELEASE UPON ORDERING         02/24/22 1217                        Condition: Good    Disposition: PACU - hemodynamically stable.    Attestation: I was present and scrubbed for the entire procedure.

## 2022-02-24 NOTE — PLAN OF CARE
PATIENT BECAME NAUSEATED AND VOMITED IN ROOM RIGHT BEFORE LEAVING ROOM. PATIENT GIVEN COMFORT WITH COLD CLOTH AND HE ADMITTED HE FELT MUCH BETTER. READY TO GO HOME     PATIENT TO CAR VIA W/C TO GO HOME WITH FAMILY.

## 2022-02-24 NOTE — PLAN OF CARE
PATIENT C/O THROAT FEELING REALLY DRY. VIEWED THROAT WITH LIGHT . NO SIGN OF SWELLING OR RESTRICTION. INFORMED PATIENT RE : MEDS THAT DR SECRETIONS. PATIENT CONTINUES TO DRINK WATER. PAIN LEVEL 6. PAIN MED GIVEN WITH CRACKERS

## 2022-02-24 NOTE — ANESTHESIA PREPROCEDURE EVALUATION
02/24/2022  Jenaro Underwood is a 61 y.o., male.      Pre-op Assessment    I have reviewed the Patient Summary Reports.     I have reviewed the Nursing Notes. I have reviewed the NPO Status.   I have reviewed the Medications.     Review of Systems  Anesthesia Hx:  No problems with previous Anesthesia    Social:  Non-Smoker, No Alcohol Use    Hematology/Oncology:  Hematology Normal   Oncology Normal     EENT/Dental:EENT/Dental Normal   Cardiovascular:   Hypertension    Pulmonary:  Pulmonary Normal    Renal/:  Renal/ Normal     Hepatic/GI:  Hepatic/GI Normal    Musculoskeletal:  Musculoskeletal Normal    Neurological:  Neurology Normal    Endocrine:  Endocrine Normal  Obesity / BMI > 30  Dermatological:  Skin Normal    Psych:  Psychiatric Normal           Physical Exam    Airway:  Mallampati: III   Mouth Opening: Normal  TM Distance: Normal  Tongue: Normal  Neck ROM: Normal ROM    Chest/Lungs:  Clear to auscultation    Heart:  Rate: Normal  Rhythm: Regular Rhythm        Anesthesia Plan  Type of Anesthesia, risks & benefits discussed:    Anesthesia Type: Gen ETT  Intra-op Monitoring Plan: Standard ASA Monitors  Post Op Pain Control Plan: IV/PO Opioids PRN  Induction:  IV  Airway Plan: Direct, Post-Induction  Informed Consent: Informed consent signed with the Patient and all parties understand the risks and agree with anesthesia plan.  All questions answered. Patient consented to blood products? Yes  ASA Score: 2  Day of Surgery Review of History & Physical: H&P Update referred to the surgeon/provider.I have interviewed and examined the patient. I have reviewed the patient's H&P dated: There are no significant changes. H&P completed by Anesthesiologist.    Ready For Surgery From Anesthesia Perspective.     .

## 2022-02-24 NOTE — ANESTHESIA PROCEDURE NOTES
Intubation    Date/Time: 2/24/2022 11:35 AM  Performed by: Marquez Woods CRNA  Authorized by: Rhys Hobbs MD     Intubation:     Induction:  Intravenous    Intubated:  Postinduction    Mask Ventilation:  Easy with oral airway    Attempts:  1    Attempted By:  CRNA    Method of Intubation:  Direct    Blade:  Haylie 4    Laryngeal View Grade: Grade IIA - cords partially seen      Difficult Airway Encountered?: No      Complications:  None    Airway Device:  Oral endotracheal tube    Airway Device Size:  7.5    Style/Cuff Inflation:  Cuffed (inflated to minimal occlusive pressure)    Tube secured:  21    Secured at:  The lips    Placement Verified By:  Capnometry    Complicating Factors:  None    Findings Post-Intubation:  BS equal bilateral and atraumatic/condition of teeth unchanged

## 2022-02-24 NOTE — OR NURSING
REC' TO PACU @ 1315 IN STABLE COND. PT ASLEEP WAKES TO VOICE. VS STABLE O2 VIA FACEMASK.    @ 1400 TRANSFERRED TO SAME DAY ROOM 11 VIA STRETCHER IN STABLE COND. REPORT GIVEN TO ANUJA HACKETT

## 2022-02-24 NOTE — TRANSFER OF CARE
"Anesthesia Transfer of Care Note    Patient: Jenaro Underwood    Procedure(s) Performed: Procedure(s) (LRB):  CHOLECYSTECTOMY, LAPAROSCOPIC (N/A)    Patient location: PACU    Anesthesia Type: general    Transport from OR: Transported from OR on 100% O2 by closed face mask with adequate spontaneous ventilation    Post pain: adequate analgesia    Post assessment: no apparent anesthetic complications    Post vital signs: stable    Level of consciousness: responds to stimulation    Nausea/Vomiting: no nausea/vomiting    Complications: none    Transfer of care protocol was followed      Last vitals:   Visit Vitals  /76   Pulse 63   Temp 36.4 °C (97.6 °F)   Resp 18   Ht 5' 8" (1.727 m)   Wt 88 kg (194 lb)   SpO2 97%   BMI 29.50 kg/m²     "

## 2022-02-24 NOTE — DISCHARGE INSTRUCTIONS
WEAR DIEGO HOSE TIL RETURN VISIT. REMOVE X 1 HOUR IN MORNING, REAPPLY  REMOVE X 1 HOUR IN AFTERNOON, REAPPLY  PUMP ANKLES  SLOWLY PROGRESS DIET  GRADUALLY INCREASE ACTIVITY. ALTERNATE REST WITH HOME ACTIVITY

## 2022-02-25 LAB
ESTROGEN SERPL-MCNC: NORMAL PG/ML
INSULIN SERPL-ACNC: NORMAL U[IU]/ML
LAB AP GROSS DESCRIPTION: NORMAL
LAB AP LABORATORY NOTES: NORMAL
T3RU NFR SERPL: NORMAL %

## 2022-03-08 DIAGNOSIS — R10.9 ABDOMINAL PAIN, UNSPECIFIED ABDOMINAL LOCATION: Primary | ICD-10-CM

## 2022-03-09 ENCOUNTER — HOSPITAL ENCOUNTER (OUTPATIENT)
Dept: RADIOLOGY | Facility: HOSPITAL | Age: 62
Discharge: HOME | End: 2022-03-09
Attending: STUDENT IN AN ORGANIZED HEALTH CARE EDUCATION/TRAINING PROGRAM
Payer: COMMERCIAL

## 2022-03-09 DIAGNOSIS — R10.9 ABDOMINAL PAIN, UNSPECIFIED ABDOMINAL LOCATION: ICD-10-CM

## 2022-03-09 PROCEDURE — 76700 US EXAM ABDOM COMPLETE: CPT | Mod: 26,,, | Performed by: RADIOLOGY

## 2022-03-09 PROCEDURE — 76700 US ABDOMEN COMPLETE: ICD-10-PCS | Mod: 26,,, | Performed by: RADIOLOGY

## 2022-03-09 PROCEDURE — 76700 US EXAM ABDOM COMPLETE: CPT | Mod: TC

## 2022-03-11 DIAGNOSIS — R13.10 DYSPHAGIA, UNSPECIFIED TYPE: ICD-10-CM

## 2022-03-11 DIAGNOSIS — R11.2 NAUSEA AND VOMITING, INTRACTABILITY OF VOMITING NOT SPECIFIED, UNSPECIFIED VOMITING TYPE: Primary | ICD-10-CM

## 2022-04-19 DIAGNOSIS — Z12.11 SPECIAL SCREENING FOR MALIGNANT NEOPLASMS, COLON: Primary | ICD-10-CM

## 2022-04-19 DIAGNOSIS — Z12.12 SCREENING FOR MALIGNANT NEOPLASM OF THE RECTUM: ICD-10-CM

## 2022-04-29 ENCOUNTER — HOSPITAL ENCOUNTER (EMERGENCY)
Facility: HOSPITAL | Age: 62
Discharge: HOME | End: 2022-04-29
Attending: EMERGENCY MEDICINE
Payer: COMMERCIAL

## 2022-04-29 VITALS
RESPIRATION RATE: 20 BRPM | SYSTOLIC BLOOD PRESSURE: 110 MMHG | WEIGHT: 170 LBS | HEIGHT: 68 IN | DIASTOLIC BLOOD PRESSURE: 67 MMHG | OXYGEN SATURATION: 97 % | BODY MASS INDEX: 25.76 KG/M2 | HEART RATE: 67 BPM | TEMPERATURE: 98 F

## 2022-04-29 DIAGNOSIS — G47.00 INSOMNIA, UNSPECIFIED TYPE: Primary | ICD-10-CM

## 2022-04-29 DIAGNOSIS — U09.9 POST COVID-19 CONDITION, UNSPECIFIED: ICD-10-CM

## 2022-04-29 DIAGNOSIS — G62.9 NEUROPATHY: ICD-10-CM

## 2022-04-29 DIAGNOSIS — R53.1 GENERAL WEAKNESS: ICD-10-CM

## 2022-04-29 DIAGNOSIS — R20.2 PARESTHESIA: ICD-10-CM

## 2022-04-29 LAB
ALBUMIN SERPL BCP-MCNC: 4.4 G/DL (ref 3.5–5)
ALBUMIN/GLOB SERPL: 1.4 {RATIO}
ALP SERPL-CCNC: 101 U/L (ref 45–115)
ALT SERPL W P-5'-P-CCNC: 62 U/L (ref 16–61)
AMPHET UR QL SCN: NEGATIVE
ANION GAP SERPL CALCULATED.3IONS-SCNC: 16 MMOL/L (ref 7–16)
APTT PPP: 33.4 SECONDS (ref 25.2–37.3)
AST SERPL W P-5'-P-CCNC: 36 U/L (ref 15–37)
BARBITURATES UR QL SCN: NEGATIVE
BASOPHILS # BLD AUTO: 0.04 K/UL (ref 0–0.2)
BASOPHILS NFR BLD AUTO: 1 % (ref 0–1)
BENZODIAZ METAB UR QL SCN: NEGATIVE
BILIRUB SERPL-MCNC: 1.6 MG/DL (ref 0–1.2)
BILIRUB UR QL STRIP: NEGATIVE
BUN SERPL-MCNC: 11 MG/DL (ref 7–18)
BUN/CREAT SERPL: 12 (ref 6–20)
CALCIUM SERPL-MCNC: 9.5 MG/DL (ref 8.5–10.1)
CANNABINOIDS UR QL SCN: NEGATIVE
CHLORIDE SERPL-SCNC: 102 MMOL/L (ref 98–107)
CLARITY UR: CLEAR
CO2 SERPL-SCNC: 22 MMOL/L (ref 21–32)
COCAINE UR QL SCN: NEGATIVE
COLOR UR: NORMAL
CREAT SERPL-MCNC: 0.94 MG/DL (ref 0.7–1.3)
DIFFERENTIAL METHOD BLD: ABNORMAL
EOSINOPHIL # BLD AUTO: 0.04 K/UL (ref 0–0.5)
EOSINOPHIL NFR BLD AUTO: 1 % (ref 1–4)
ERYTHROCYTE [DISTWIDTH] IN BLOOD BY AUTOMATED COUNT: 12.7 % (ref 11.5–14.5)
GLOBULIN SER-MCNC: 3.2 G/DL (ref 2–4)
GLUCOSE SERPL-MCNC: 99 MG/DL (ref 74–106)
GLUCOSE UR STRIP-MCNC: NEGATIVE MG/DL
HCT VFR BLD AUTO: 44.7 % (ref 40–54)
HGB BLD-MCNC: 15.5 G/DL (ref 13.5–18)
IMM GRANULOCYTES # BLD AUTO: 0.01 K/UL (ref 0–0.04)
IMM GRANULOCYTES NFR BLD: 0.2 % (ref 0–0.4)
INR BLD: 1.22 (ref 0.9–1.1)
KETONES UR STRIP-SCNC: NEGATIVE MG/DL
LEUKOCYTE ESTERASE UR QL STRIP: NEGATIVE
LYMPHOCYTES # BLD AUTO: 1.02 K/UL (ref 1–4.8)
LYMPHOCYTES NFR BLD AUTO: 24.3 % (ref 27–41)
MAGNESIUM SERPL-MCNC: 2.3 MG/DL (ref 1.7–2.3)
MCH RBC QN AUTO: 31 PG (ref 27–31)
MCHC RBC AUTO-ENTMCNC: 34.7 G/DL (ref 32–36)
MCV RBC AUTO: 89.4 FL (ref 80–96)
MONOCYTES # BLD AUTO: 0.46 K/UL (ref 0–0.8)
MONOCYTES NFR BLD AUTO: 11 % (ref 2–6)
MPC BLD CALC-MCNC: 9.7 FL (ref 9.4–12.4)
NEUTROPHILS # BLD AUTO: 2.63 K/UL (ref 1.8–7.7)
NEUTROPHILS NFR BLD AUTO: 62.5 % (ref 53–65)
NITRITE UR QL STRIP: NEGATIVE
NRBC # BLD AUTO: 0 X10E3/UL
NRBC, AUTO (.00): 0 %
OPIATES UR QL SCN: NEGATIVE
PCP UR QL SCN: NEGATIVE
PH UR STRIP: 7 PH UNITS
PLATELET # BLD AUTO: 166 K/UL (ref 150–400)
POTASSIUM SERPL-SCNC: 4 MMOL/L (ref 3.5–5.1)
PROT SERPL-MCNC: 7.6 G/DL (ref 6.4–8.2)
PROT UR QL STRIP: NEGATIVE
PROTHROMBIN TIME: 15.3 SECONDS (ref 11.7–14.7)
RBC # BLD AUTO: 5 M/UL (ref 4.6–6.2)
RBC # UR STRIP: NEGATIVE /UL
SODIUM SERPL-SCNC: 136 MMOL/L (ref 136–145)
SP GR UR STRIP: <=1.005
T4 FREE SERPL-MCNC: 1.1 NG/DL (ref 0.76–1.46)
TSH SERPL DL<=0.005 MIU/L-ACNC: 1.44 UIU/ML (ref 0.36–3.74)
UROBILINOGEN UR STRIP-ACNC: 0.2 MG/DL
WBC # BLD AUTO: 4.2 K/UL (ref 4.5–11)

## 2022-04-29 PROCEDURE — 96361 HYDRATE IV INFUSION ADD-ON: CPT

## 2022-04-29 PROCEDURE — 25000003 PHARM REV CODE 250: Performed by: EMERGENCY MEDICINE

## 2022-04-29 PROCEDURE — 80053 COMPREHEN METABOLIC PANEL: CPT | Performed by: EMERGENCY MEDICINE

## 2022-04-29 PROCEDURE — 85730 THROMBOPLASTIN TIME PARTIAL: CPT | Performed by: EMERGENCY MEDICINE

## 2022-04-29 PROCEDURE — 83735 ASSAY OF MAGNESIUM: CPT | Performed by: EMERGENCY MEDICINE

## 2022-04-29 PROCEDURE — 84439 ASSAY OF FREE THYROXINE: CPT | Performed by: EMERGENCY MEDICINE

## 2022-04-29 PROCEDURE — 96374 THER/PROPH/DIAG INJ IV PUSH: CPT

## 2022-04-29 PROCEDURE — 93005 ELECTROCARDIOGRAM TRACING: CPT

## 2022-04-29 PROCEDURE — 85610 PROTHROMBIN TIME: CPT | Performed by: EMERGENCY MEDICINE

## 2022-04-29 PROCEDURE — 96375 TX/PRO/DX INJ NEW DRUG ADDON: CPT

## 2022-04-29 PROCEDURE — 63600175 PHARM REV CODE 636 W HCPCS: Performed by: EMERGENCY MEDICINE

## 2022-04-29 PROCEDURE — 93010 PR ELECTROCARDIOGRAM REPORT: ICD-10-PCS | Mod: ,,, | Performed by: STUDENT IN AN ORGANIZED HEALTH CARE EDUCATION/TRAINING PROGRAM

## 2022-04-29 PROCEDURE — 80307 DRUG TEST PRSMV CHEM ANLYZR: CPT | Performed by: EMERGENCY MEDICINE

## 2022-04-29 PROCEDURE — 93010 ELECTROCARDIOGRAM REPORT: CPT | Mod: ,,, | Performed by: STUDENT IN AN ORGANIZED HEALTH CARE EDUCATION/TRAINING PROGRAM

## 2022-04-29 PROCEDURE — 81003 URINALYSIS AUTO W/O SCOPE: CPT | Mod: 59 | Performed by: EMERGENCY MEDICINE

## 2022-04-29 PROCEDURE — 99285 EMERGENCY DEPT VISIT HI MDM: CPT | Mod: 25

## 2022-04-29 PROCEDURE — 99284 EMERGENCY DEPT VISIT MOD MDM: CPT | Mod: ,,, | Performed by: EMERGENCY MEDICINE

## 2022-04-29 PROCEDURE — 99284 PR EMERGENCY DEPT VISIT,LEVEL IV: ICD-10-PCS | Mod: ,,, | Performed by: EMERGENCY MEDICINE

## 2022-04-29 PROCEDURE — 84443 ASSAY THYROID STIM HORMONE: CPT | Performed by: EMERGENCY MEDICINE

## 2022-04-29 PROCEDURE — 85025 COMPLETE CBC W/AUTO DIFF WBC: CPT | Performed by: EMERGENCY MEDICINE

## 2022-04-29 PROCEDURE — 36415 COLL VENOUS BLD VENIPUNCTURE: CPT | Performed by: EMERGENCY MEDICINE

## 2022-04-29 PROCEDURE — 96372 THER/PROPH/DIAG INJ SC/IM: CPT

## 2022-04-29 RX ORDER — HYDROXYZINE HYDROCHLORIDE 25 MG/1
25 TABLET, FILM COATED ORAL 4 TIMES DAILY PRN
Qty: 12 TABLET | Refills: 0 | Status: SHIPPED | OUTPATIENT
Start: 2022-04-29 | End: 2023-06-25 | Stop reason: CLARIF

## 2022-04-29 RX ORDER — GABAPENTIN 100 MG/1
100 CAPSULE ORAL 3 TIMES DAILY
Qty: 90 CAPSULE | Refills: 2 | Status: SHIPPED | OUTPATIENT
Start: 2022-04-29 | End: 2023-04-29

## 2022-04-29 RX ORDER — HALOPERIDOL 5 MG/ML
5 INJECTION INTRAMUSCULAR
Status: COMPLETED | OUTPATIENT
Start: 2022-04-29 | End: 2022-04-29

## 2022-04-29 RX ORDER — LORAZEPAM 2 MG/ML
1 INJECTION INTRAMUSCULAR
Status: COMPLETED | OUTPATIENT
Start: 2022-04-29 | End: 2022-04-29

## 2022-04-29 RX ADMIN — SODIUM CHLORIDE 1000 ML: 9 INJECTION, SOLUTION INTRAVENOUS at 10:04

## 2022-04-29 RX ADMIN — HALOPERIDOL LACTATE 5 MG: 5 INJECTION, SOLUTION INTRAMUSCULAR at 10:04

## 2022-04-29 RX ADMIN — LORAZEPAM 1 MG: 2 INJECTION INTRAMUSCULAR; INTRAVENOUS at 12:04

## 2022-04-29 RX ADMIN — HALOPERIDOL LACTATE 5 MG: 5 INJECTION, SOLUTION INTRAMUSCULAR at 12:04

## 2022-04-29 NOTE — DISCHARGE INSTRUCTIONS
Follow-up with primary care.  Return the ER for symptoms are worsening.  Increase her sleep and reduce her stress level.  Eat a nutritious diet.  Gradually advance her diet as tolerated.

## 2022-04-29 NOTE — ED TRIAGE NOTES
C/o being nervous, anxious, palms and feet tingling.  Has seen Dr Zamarripa in [place] and started on Buspar 4/18, and Lexapro 4/28.

## 2022-07-19 ENCOUNTER — OFFICE VISIT (OUTPATIENT)
Dept: GASTROENTEROLOGY | Facility: CLINIC | Age: 62
End: 2022-07-19
Payer: COMMERCIAL

## 2022-07-19 VITALS
HEIGHT: 68 IN | WEIGHT: 178.19 LBS | OXYGEN SATURATION: 98 % | SYSTOLIC BLOOD PRESSURE: 121 MMHG | DIASTOLIC BLOOD PRESSURE: 77 MMHG | BODY MASS INDEX: 27.01 KG/M2 | HEART RATE: 72 BPM

## 2022-07-19 DIAGNOSIS — Z12.11 ENCOUNTER FOR SCREENING COLONOSCOPY: ICD-10-CM

## 2022-07-19 DIAGNOSIS — R13.19 ESOPHAGEAL DYSPHAGIA: ICD-10-CM

## 2022-07-19 DIAGNOSIS — R14.2 BELCHING: ICD-10-CM

## 2022-07-19 DIAGNOSIS — R74.8 ELEVATED LIVER ENZYMES: ICD-10-CM

## 2022-07-19 DIAGNOSIS — K21.9 GASTROESOPHAGEAL REFLUX DISEASE, UNSPECIFIED WHETHER ESOPHAGITIS PRESENT: Primary | ICD-10-CM

## 2022-07-19 PROCEDURE — 99204 OFFICE O/P NEW MOD 45 MIN: CPT | Mod: ,,, | Performed by: NURSE PRACTITIONER

## 2022-07-19 PROCEDURE — 3078F DIAST BP <80 MM HG: CPT | Mod: ,,, | Performed by: NURSE PRACTITIONER

## 2022-07-19 PROCEDURE — 3074F PR MOST RECENT SYSTOLIC BLOOD PRESSURE < 130 MM HG: ICD-10-PCS | Mod: ,,, | Performed by: NURSE PRACTITIONER

## 2022-07-19 PROCEDURE — 1159F MED LIST DOCD IN RCRD: CPT | Mod: ,,, | Performed by: NURSE PRACTITIONER

## 2022-07-19 PROCEDURE — 99204 PR OFFICE/OUTPT VISIT, NEW, LEVL IV, 45-59 MIN: ICD-10-PCS | Mod: ,,, | Performed by: NURSE PRACTITIONER

## 2022-07-19 PROCEDURE — 3074F SYST BP LT 130 MM HG: CPT | Mod: ,,, | Performed by: NURSE PRACTITIONER

## 2022-07-19 PROCEDURE — 85610 PROTHROMBIN TIME: CPT | Performed by: NURSE PRACTITIONER

## 2022-07-19 PROCEDURE — 3008F PR BODY MASS INDEX (BMI) DOCUMENTED: ICD-10-PCS | Mod: ,,, | Performed by: NURSE PRACTITIONER

## 2022-07-19 PROCEDURE — 3078F PR MOST RECENT DIASTOLIC BLOOD PRESSURE < 80 MM HG: ICD-10-PCS | Mod: ,,, | Performed by: NURSE PRACTITIONER

## 2022-07-19 PROCEDURE — 3008F BODY MASS INDEX DOCD: CPT | Mod: ,,, | Performed by: NURSE PRACTITIONER

## 2022-07-19 PROCEDURE — 1160F RVW MEDS BY RX/DR IN RCRD: CPT | Mod: ,,, | Performed by: NURSE PRACTITIONER

## 2022-07-19 PROCEDURE — 1160F PR REVIEW ALL MEDS BY PRESCRIBER/CLIN PHARMACIST DOCUMENTED: ICD-10-PCS | Mod: ,,, | Performed by: NURSE PRACTITIONER

## 2022-07-19 PROCEDURE — 1159F PR MEDICATION LIST DOCUMENTED IN MEDICAL RECORD: ICD-10-PCS | Mod: ,,, | Performed by: NURSE PRACTITIONER

## 2022-07-19 RX ORDER — PANTOPRAZOLE SODIUM 40 MG/1
40 TABLET, DELAYED RELEASE ORAL DAILY
Qty: 30 TABLET | Refills: 2 | Status: SHIPPED | OUTPATIENT
Start: 2022-07-19 | End: 2022-10-17

## 2022-07-19 NOTE — PROGRESS NOTES
"    Jenaro Underwood is a 62 y.o. male here for Elevated Hepatic Enzymes        PCP: Malgorzata Zamarripa  Referring Provider: No referring provider defined for this encounter.     HPI:  Presents for report of multiple GI complaints. He had a cholecystectomy in February. States that he has had increased reflux and unintentional weight loss of 40 lbs since that time. States that he has difficulty describing his GI complaints and that this does cause increased anxiety and depression. Reports sleep disturbance due to worry. Of note he also had all his teeth pulled and has new dentures. Reports that he occasionally feels that his food is "stuck" and will not go down.Reports reflux. Not on PPI. States that he takes multiple TUMS per day. Does report hematochezia but states he believes that this is from a hemorrhoid. No previous EGD or colonoscopy. Abdominal ultrasound in March, read as normal. MRI abdomen 2/10/22, no biliary obstruction. Liver and pancreas read as normal. Review of labs. Liver enzymes are minimally elevated. ALT 62 and Bilirubin 1.6.        ROS:  Review of Systems   Constitutional: Positive for unexpected weight change. Negative for activity change, appetite change, fatigue and fever.   HENT: Positive for dental problem (all teeth pulled 3/22) and trouble swallowing.    Respiratory: Negative for cough and shortness of breath.    Cardiovascular: Negative for chest pain.   Gastrointestinal: Positive for abdominal pain (intermittent), anal bleeding, blood in stool and reflux. Negative for abdominal distention, change in bowel habit, constipation, diarrhea, nausea, vomiting and change in bowel habit.   Musculoskeletal: Negative for gait problem.   Integumentary:  Negative for color change.   Neurological: Negative for dizziness and light-headedness.   Hematological: Does not bruise/bleed easily.   Psychiatric/Behavioral: Positive for sleep disturbance. Negative for self-injury and suicidal ideas. The patient " "is nervous/anxious.           PMHX:  has a past medical history of Hypertension.    PSHX:  has a past surgical history that includes Hernia repair; Knee surgery; and Laparoscopic cholecystectomy (N/A, 2/24/2022).    PFHX: family history is not on file.    PSlHX:  reports that he has quit smoking. He has never used smokeless tobacco. He reports that he does not drink alcohol and does not use drugs.        Review of patient's allergies indicates:  No Known Allergies    Medication List with Changes/Refills   New Medications    PANTOPRAZOLE (PROTONIX) 40 MG TABLET    Take 1 tablet (40 mg total) by mouth once daily.   Current Medications    ALBUTEROL (PROVENTIL) 2.5 MG /3 ML (0.083 %) NEBULIZER SOLUTION    NEBULIZE 1 VIAL EVERY 6 HOURS IF NEEDED FOR WHEEZING,COUGH OR SHORTNESS OF BREATH.    DOCUSATE SODIUM (COLACE) 100 MG CAPSULE    Take 1 capsule (100 mg total) by mouth 2 (two) times daily.    GABAPENTIN (NEURONTIN) 100 MG CAPSULE    Take 1 capsule (100 mg total) by mouth 3 (three) times daily.    HYDROXYZINE HCL (ATARAX) 25 MG TABLET    Take 1 tablet (25 mg total) by mouth 4 (four) times daily as needed for Anxiety.        Objective Findings:  Vital Signs:  /77   Pulse 72   Ht 5' 8" (1.727 m)   Wt 80.8 kg (178 lb 3.2 oz)   SpO2 98%   BMI 27.10 kg/m²  Body mass index is 27.1 kg/m².    Physical Exam:  Physical Exam  Vitals and nursing note reviewed.   Constitutional:       General: He is not in acute distress.     Appearance: Normal appearance.   HENT:      Mouth/Throat:      Mouth: Mucous membranes are moist.   Cardiovascular:      Rate and Rhythm: Normal rate and regular rhythm.   Pulmonary:      Breath sounds: No wheezing, rhonchi or rales.   Abdominal:      General: Bowel sounds are normal. There is no distension.      Palpations: Abdomen is soft. There is no mass.      Tenderness: There is no abdominal tenderness. There is no guarding or rebound.      Hernia: No hernia is present.   Skin:     General: " Skin is warm and dry.      Coloration: Skin is not jaundiced or pale.   Neurological:      Mental Status: He is alert and oriented to person, place, and time.   Psychiatric:         Mood and Affect: Mood is anxious.          Labs:  Lab Results   Component Value Date    WBC 4.20 (L) 04/29/2022    HGB 15.5 04/29/2022    HCT 44.7 04/29/2022    MCV 89.4 04/29/2022    RDW 12.7 04/29/2022     04/29/2022    LYMPH 24.3 (L) 04/29/2022    LYMPH 1.02 04/29/2022    MONO 11.0 (H) 04/29/2022    EOS 0.04 04/29/2022    BASO 0.04 04/29/2022     Lab Results   Component Value Date     04/29/2022    K 4.0 04/29/2022     04/29/2022    CO2 22 04/29/2022    GLU 99 04/29/2022    BUN 11 04/29/2022    CREATININE 0.94 04/29/2022    CALCIUM 9.5 04/29/2022    PROT 7.6 04/29/2022    ALBUMIN 4.4 04/29/2022    BILITOT 1.6 (H) 04/29/2022    ALKPHOS 101 04/29/2022    AST 36 04/29/2022    ALT 62 (H) 04/29/2022         Imaging: No results found.      Assessment:  Jenaro Underwood is a 62 y.o. male here with:  1. Gastroesophageal reflux disease, unspecified whether esophagitis present    2. Esophageal dysphagia    3. Encounter for screening colonoscopy    4. Belching    5. Elevated liver enzymes          Recommendations:  1. EGD/Dilation  2. Protonix 40 mg daily  3. Avoid spicy, greasy foods  Avoid caffeine, citric acid, chocolate, peppermint, and carbonated drinks  Do not lay down within 3 hours of eating  Exercise 150 minutes per week  Increase fluid to 64 ounces daily  Avoid antiinflammatory medications such as motrin, advil, aleve, ibuprofen, and BC powder  4.Schedule colonoscopy  5. Liver labs today  6. Should discuss sleep disturbance and anxiety with primary care provider        Follow up in about 6 weeks (around 8/30/2022).      Order summary:  Orders Placed This Encounter    Ceruloplasmin    Hepatitis B Core Antibody, IgM    EVELYN EIA w/ Reflex to dsDNA/DANITA    Alpha-1-Antitrypsin    Hepatitis C Antibody    Hepatitis  B Surface Ab, Qualitative    Hepatitis B Surface Antigen    Ferritin    Iron and TIBC    Protein Electrophoresis, Serum with Reflex NORMA    CBC Auto Differential    Comprehensive Metabolic Panel    Protime-INR    Hepatitis A Antibody, Total    Endomysial antibody, IgA titer    Tissue Transglutaminase Ab, IgA    Gliadin (Deamidated) Ab, Eval    pantoprazole (PROTONIX) 40 MG tablet    Colonoscopy    EGD       Thank you for allowing me to participate in the care of Jenaro Galan Kj.      Mana Obrien, ЮЛИЯP-C

## 2022-07-19 NOTE — PATIENT INSTRUCTIONS
EGD/Dilation  2. Protonix 40 mg daily  3. Avoid spicy, greasy foods  Avoid caffeine, citric acid, chocolate, peppermint, and carbonated drinks  Do not lay down within 3 hours of eating  Exercise 150 minutes per week  Increase fluid to 64 ounces daily  Avoid antiinflammatory medications such as motrin, advil, aleve, ibuprofen, and BC powder  4.Schedule colonoscopy  5. Liver labs

## 2022-07-21 ENCOUNTER — TELEPHONE (OUTPATIENT)
Dept: GASTROENTEROLOGY | Facility: CLINIC | Age: 62
End: 2022-07-21
Payer: COMMERCIAL

## 2022-07-21 NOTE — TELEPHONE ENCOUNTER
Attempted to call results. Left message.        ----- Message from POOJA Patel sent at 7/21/2022  2:41 PM CDT -----  Hepatitis is negative. He is not immune to hepatitis A or B. We will recommend vaccine at the office visit. Ferritin is elevated which can be from inflammation. Some labs are pending.    
(2) very limited

## 2022-07-21 NOTE — TELEPHONE ENCOUNTER
Patients wife returned call. Lab results given. Verbalized understanding.        ----- Message from POOJA Patel sent at 7/21/2022  2:41 PM CDT -----  Hepatitis is negative. He is not immune to hepatitis A or B. We will recommend vaccine at the office visit. Ferritin is elevated which can be from inflammation. Some labs are pending.

## 2023-06-25 ENCOUNTER — HOSPITAL ENCOUNTER (EMERGENCY)
Facility: HOSPITAL | Age: 63
Discharge: HOME | End: 2023-06-25
Attending: SPECIALIST
Payer: COMMERCIAL

## 2023-06-25 VITALS
SYSTOLIC BLOOD PRESSURE: 161 MMHG | WEIGHT: 205 LBS | RESPIRATION RATE: 19 BRPM | BODY MASS INDEX: 31.07 KG/M2 | HEIGHT: 68 IN | DIASTOLIC BLOOD PRESSURE: 81 MMHG | TEMPERATURE: 98 F | HEART RATE: 73 BPM | OXYGEN SATURATION: 97 %
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
## 2023-06-25 DIAGNOSIS — S93.402A SPRAIN OF LEFT ANKLE, UNSPECIFIED LIGAMENT, INITIAL ENCOUNTER: ICD-10-CM

## 2023-06-25 PROCEDURE — 99283 EMERGENCY DEPT VISIT LOW MDM: CPT

## 2023-06-25 PROCEDURE — 99283 PR EMERGENCY DEPT VISIT,LEVEL III: ICD-10-PCS | Mod: ,,, | Performed by: SPECIALIST

## 2023-06-25 PROCEDURE — 99283 EMERGENCY DEPT VISIT LOW MDM: CPT | Mod: ,,, | Performed by: SPECIALIST

## 2023-06-25 RX ORDER — TRAZODONE HYDROCHLORIDE 150 MG/1
300 TABLET ORAL NIGHTLY PRN
COMMUNITY
Start: 2023-06-22

## 2023-06-25 RX ORDER — SERTRALINE HYDROCHLORIDE 100 MG/1
TABLET, FILM COATED ORAL DAILY
COMMUNITY
Start: 2023-06-22

## 2023-06-25 RX ORDER — OLANZAPINE 5 MG/1
5 TABLET ORAL DAILY
COMMUNITY
Start: 2023-06-22

## 2023-06-26 NOTE — ED TRIAGE NOTES
Patient states he stepped down off an embankment and his left ankle popped, denies any rolling of ankle. Pt states he can not bear weight without severe pain. No discoloration or swelling noted.

## 2023-06-26 NOTE — DISCHARGE INSTRUCTIONS
Ibuprofen 800 mg oral every 6 hrs as needed for pain  Elevate   Ice  F/U with your pcp for continued pain

## 2023-06-26 NOTE — ED PROVIDER NOTES
Encounter Date: 6/25/2023       History     Chief Complaint   Patient presents with    Ankle Pain     Left r/t injury      Patient fell tonight directly down straight into a ditch.  Patient states that the left ankle is hurting particularly on the lateral malleolus.  He has pain with walking.     Review of patient's allergies indicates:  No Known Allergies  Past Medical History:   Diagnosis Date    Anxiety disorder, unspecified     Hypertension      Past Surgical History:   Procedure Laterality Date    HERNIA REPAIR      umbilical    KNEE SURGERY      LAPAROSCOPIC CHOLECYSTECTOMY N/A 2/24/2022    Procedure: CHOLECYSTECTOMY, LAPAROSCOPIC;  Surgeon: Beto Mullen DO;  Location: [facility] OR;  Service: General;  Laterality: N/A;     History reviewed. No pertinent family history.  Social History     Tobacco Use    Smoking status: Former    Smokeless tobacco: Never    Tobacco comments:     quit 40 years   Substance Use Topics    Alcohol use: Never    Drug use: Never     Review of Systems   Musculoskeletal:  Positive for joint swelling.   All other systems reviewed and are negative.    Physical Exam     Initial Vitals [06/25/23 2202]   BP Pulse Resp Temp SpO2   (!) 174/89 72 18 98.2 °F (36.8 °C) 97 %      MAP       --         Physical Exam    Nursing note and vitals reviewed.  Constitutional: He appears well-developed and well-nourished. No distress.   HENT:   Head: Normocephalic and atraumatic.   Eyes: Pupils are equal, round, and reactive to light.   Neck: Neck supple.   Normal range of motion.  Cardiovascular:  Normal rate.           Pulmonary/Chest: Breath sounds normal.   Musculoskeletal:         General: Tenderness present.      Cervical back: Normal range of motion and neck supple.      Comments: Left ankle with min crepitus on the lateral malleolus with minimal swelling and tenderness     Neurological: He is alert and oriented to person, place, and time. He has normal strength.   Skin: Skin is warm.    Psychiatric: He has a normal mood and affect. Thought content normal.       Medical Screening Exam   See Full Note    ED Course   Procedures  Labs Reviewed - No data to display       Imaging Results              X-Ray Ankle Complete Left (In process)                      Medications - No data to display                          Clinical Impression:   Final diagnoses:  [W19.XXXA] Fall  [W19.XXXA] Fall, initial encounter (Primary)  [S93.402A] Sprain of left ankle, unspecified ligament, initial encounter        ED Disposition Condition    Discharge Stable          ED Prescriptions    None       Follow-up Information    None          Carissa Stack MD  06/26/23 0524

## 2023-06-29 ENCOUNTER — TELEPHONE (OUTPATIENT)
Dept: EMERGENCY MEDICINE | Facility: HOSPITAL | Age: 63
End: 2023-06-29
Payer: COMMERCIAL

## 2023-06-29 DIAGNOSIS — M79.672 LEFT FOOT PAIN: Primary | ICD-10-CM

## 2023-06-29 DIAGNOSIS — M25.572 CHRONIC PAIN OF LEFT ANKLE: ICD-10-CM

## 2023-06-29 DIAGNOSIS — G89.29 CHRONIC PAIN OF LEFT ANKLE: ICD-10-CM

## 2023-06-30 ENCOUNTER — HOSPITAL ENCOUNTER (OUTPATIENT)
Dept: RADIOLOGY | Facility: HOSPITAL | Age: 63
Discharge: HOME | End: 2023-06-30
Attending: NURSE PRACTITIONER
Payer: COMMERCIAL

## 2023-06-30 ENCOUNTER — OFFICE VISIT (OUTPATIENT)
Dept: ORTHOPEDICS | Facility: CLINIC | Age: 63
End: 2023-06-30
Payer: COMMERCIAL

## 2023-06-30 VITALS
BODY MASS INDEX: 31.07 KG/M2 | SYSTOLIC BLOOD PRESSURE: 148 MMHG | DIASTOLIC BLOOD PRESSURE: 73 MMHG | RESPIRATION RATE: 16 BRPM | OXYGEN SATURATION: 98 % | HEART RATE: 67 BPM | TEMPERATURE: 98 F | HEIGHT: 68 IN | WEIGHT: 205 LBS

## 2023-06-30 DIAGNOSIS — M79.672 LEFT FOOT PAIN: ICD-10-CM

## 2023-06-30 DIAGNOSIS — M25.572 PAIN OF JOINT OF LEFT ANKLE AND FOOT: ICD-10-CM

## 2023-06-30 DIAGNOSIS — M25.572 CHRONIC PAIN OF LEFT ANKLE: ICD-10-CM

## 2023-06-30 DIAGNOSIS — G89.29 CHRONIC PAIN OF LEFT ANKLE: ICD-10-CM

## 2023-06-30 DIAGNOSIS — S93.401A SPRAIN OF RIGHT ANKLE, UNSPECIFIED LIGAMENT, INITIAL ENCOUNTER: Primary | ICD-10-CM

## 2023-06-30 PROCEDURE — 99213 OFFICE O/P EST LOW 20 MIN: CPT | Mod: PBBFAC | Performed by: NURSE PRACTITIONER

## 2023-06-30 PROCEDURE — 1160F PR REVIEW ALL MEDS BY PRESCRIBER/CLIN PHARMACIST DOCUMENTED: ICD-10-PCS | Mod: ,,, | Performed by: NURSE PRACTITIONER

## 2023-06-30 PROCEDURE — 1159F MED LIST DOCD IN RCRD: CPT | Mod: ,,, | Performed by: NURSE PRACTITIONER

## 2023-06-30 PROCEDURE — 3008F BODY MASS INDEX DOCD: CPT | Mod: ,,, | Performed by: NURSE PRACTITIONER

## 2023-06-30 PROCEDURE — 1160F RVW MEDS BY RX/DR IN RCRD: CPT | Mod: ,,, | Performed by: NURSE PRACTITIONER

## 2023-06-30 PROCEDURE — 1159F PR MEDICATION LIST DOCUMENTED IN MEDICAL RECORD: ICD-10-PCS | Mod: ,,, | Performed by: NURSE PRACTITIONER

## 2023-06-30 PROCEDURE — 3077F SYST BP >= 140 MM HG: CPT | Mod: ,,, | Performed by: NURSE PRACTITIONER

## 2023-06-30 PROCEDURE — 99214 OFFICE O/P EST MOD 30 MIN: CPT | Mod: S$PBB,,, | Performed by: NURSE PRACTITIONER

## 2023-06-30 PROCEDURE — 3077F PR MOST RECENT SYSTOLIC BLOOD PRESSURE >= 140 MM HG: ICD-10-PCS | Mod: ,,, | Performed by: NURSE PRACTITIONER

## 2023-06-30 PROCEDURE — 3078F DIAST BP <80 MM HG: CPT | Mod: ,,, | Performed by: NURSE PRACTITIONER

## 2023-06-30 PROCEDURE — 3078F PR MOST RECENT DIASTOLIC BLOOD PRESSURE < 80 MM HG: ICD-10-PCS | Mod: ,,, | Performed by: NURSE PRACTITIONER

## 2023-06-30 PROCEDURE — 3008F PR BODY MASS INDEX (BMI) DOCUMENTED: ICD-10-PCS | Mod: ,,, | Performed by: NURSE PRACTITIONER

## 2023-06-30 PROCEDURE — 99214 PR OFFICE/OUTPT VISIT, EST, LEVL IV, 30-39 MIN: ICD-10-PCS | Mod: S$PBB,,, | Performed by: NURSE PRACTITIONER

## 2023-06-30 NOTE — PROGRESS NOTES
63-year-old male patient presents ambulatory on crutches to the orthopedic clinic complaint of pain in injury to her left ankle.  He reportedly stepped off an embankment onto his left foot and felt and heard an audible pop.  States he is had significant pain and discomfort since that time.  He has been unable to weightbear.  He was subsequently presented to hospital emergency department on 06/25/2023 where x-rays were obtained of the left ankle AP, lateral and mortise view.  Her mild DJD changes noted.  There is no evidence of acute fracture, dislocation or pathologic bone noted.  He was given a walking boot and given crutches.  He was instructed after hours to attempt to weightbear.  States he had significant pain with weight-bearing.  He states he subsequently re-presented back to the emergency department was given Norco 5 for pain.      PE:  He is noted be ambulating nonweightbearing left lower extremity with crutches with a walking boot in place.  Walking boot removed.  There is mid and forefoot swelling which to be expected.  There is well with the mediolateral malleolar regions.  Ecchymosis is noted.  There is tenderness to palpation malleolus.  There is no tenderness palpation about the foot or medial malleolus.  Pedal pulse 2/4.  Capillary refill digits left foot less than 3 seconds.      Impression:  Sprain-left ankle    Plan: Safety guidelines and activity restrictions discussed with the patient.  Verbalizes understanding.  Given his level of discomfort and swelling with ecchymosis we will obtain MRI examination of the left ankle.  Continue walking boot.  May remove for rest and sleep.  Ice elevate.  Recommend aspirin 325 mg p.o. daily if able to take for DVT prophylaxis.  We will have return orthopedic clinic with MRI results or sooner as needed.

## 2023-06-30 NOTE — PATIENT INSTRUCTIONS
Pt resting comfortably in bed. Educated on the need to work with Pt/Ot, pt asking for snacks and more to drink, educated on the high blood sugars are affecting thirst.    Safety guidelines and activity restrictions discussed with the patient.  Verbalizes understanding.  Given his level of discomfort and swelling with ecchymosis we will obtain MRI examination of the left ankle.  Continue walking boot.  May remove for rest and sleep.  Ice elevate.  Recommend aspirin 325 mg p.o. daily if able to take for DVT prophylaxis.  We will have return orthopedic clinic with MRI results or sooner as needed.

## 2023-07-27 ENCOUNTER — HOSPITAL ENCOUNTER (OUTPATIENT)
Dept: RADIOLOGY | Facility: HOSPITAL | Age: 63
Discharge: HOME | End: 2023-07-27
Attending: ORTHOPAEDIC SURGERY
Payer: COMMERCIAL

## 2023-07-27 ENCOUNTER — OFFICE VISIT (OUTPATIENT)
Dept: ORTHOPEDICS | Facility: CLINIC | Age: 63
End: 2023-07-27
Payer: COMMERCIAL

## 2023-07-27 DIAGNOSIS — M25.572 ACUTE LEFT ANKLE PAIN: ICD-10-CM

## 2023-07-27 DIAGNOSIS — S93.492A HIGH ANKLE SPRAIN OF LEFT LOWER EXTREMITY, INITIAL ENCOUNTER: Primary | ICD-10-CM

## 2023-07-27 PROCEDURE — 99213 PR OFFICE/OUTPT VISIT, EST, LEVL III, 20-29 MIN: ICD-10-PCS | Mod: S$PBB,,, | Performed by: ORTHOPAEDIC SURGERY

## 2023-07-27 PROCEDURE — 73610 X-RAY EXAM OF ANKLE: CPT | Mod: 26,LT,, | Performed by: ORTHOPAEDIC SURGERY

## 2023-07-27 PROCEDURE — 99212 OFFICE O/P EST SF 10 MIN: CPT | Mod: PBBFAC | Performed by: ORTHOPAEDIC SURGERY

## 2023-07-27 PROCEDURE — 73610 XR ANKLE COMPLETE 3 VIEW LEFT: ICD-10-PCS | Mod: 26,LT,, | Performed by: ORTHOPAEDIC SURGERY

## 2023-07-27 PROCEDURE — 73610 X-RAY EXAM OF ANKLE: CPT | Mod: TC,LT

## 2023-07-27 PROCEDURE — 99213 OFFICE O/P EST LOW 20 MIN: CPT | Mod: S$PBB,,, | Performed by: ORTHOPAEDIC SURGERY

## 2023-07-27 RX ORDER — METOPROLOL SUCCINATE 50 MG/1
50 TABLET, EXTENDED RELEASE ORAL
COMMUNITY
Start: 2023-07-25

## 2023-07-27 RX ORDER — NAPROXEN 500 MG/1
500 TABLET ORAL 2 TIMES DAILY WITH MEALS
Qty: 60 TABLET | Refills: 3 | Status: SHIPPED | OUTPATIENT
Start: 2023-07-27

## 2023-07-27 NOTE — PROGRESS NOTES
ASSESSMENT:      ICD-10-CM ICD-9-CM   1. High ankle sprain of left lower extremity, initial encounter  S93.492A 845.03       PLAN:     -Findings and treatment options were discussed with the patient  -All questions answered  There are no Patient Instructions on file for this visit.  Will treat this high ankle sprain with a high Cam boot he has been in a low Cam boot she will transition to a more high-top Cam boot to help stabilize him.  Recommend assisted weight-bearing with crutches at this time.  Understands it may take ultimately 8 weeks to get better.  See back in 4 weeks time with x-rays  IMAGING:  X-Ray Ankle Complete 3 View Left    Result Date: 7/27/2023  See Procedure Notes for results. IMPRESSION: Please see Ortho procedure notes for report.  This procedure was auto-finalized by: Virtual Radiologist   Three views left ankle were obtained today demonstrating well-preserved medial clear space no signs of syndesmotic widening.  There are ever some chronic changes seen within the distal fibula adjacent to the syndesmosis which may represent syndesmotic disruption.        HPI:   Jenaro Underwood is a pleasant 63 y.o. patient who reports to clinic for evaluation of left ankle sprain.  He was walking down an embankment when he  and    Injury onset and description: 6-  Patient's occupation: retired    This is not a work related injury.   This injury has been non-responsive to conservative care. The pain is worse with repetitive use, and strenuous activity is very difficult.  his pain improves with rest.    he has not been treated with physical therapy.  he has been treated with over the counter medication such as NSAIDs  he has not had advanced imaging such as MRI  he rates pain as a  8/10on the Visual Analog Scale.          PAST MEDICAL HISTORY:   Past Medical History:   Diagnosis Date    Anxiety disorder, unspecified     Hypertension        PAST SURGICAL HISTORY:   Past Surgical History:   Procedure  Laterality Date    HERNIA REPAIR      umbilical    KNEE SURGERY      LAPAROSCOPIC CHOLECYSTECTOMY N/A 2/24/2022    Procedure: CHOLECYSTECTOMY, LAPAROSCOPIC;  Surgeon: Beto Mullen DO;  Location: [facility];  Service: General;  Laterality: N/A;       SOCIAL HISTORY:     Social History     Socioeconomic History    Marital status: Unknown   Tobacco Use    Smoking status: Former    Smokeless tobacco: Never    Tobacco comments:     quit 40 years   Substance and Sexual Activity    Alcohol use: Never    Drug use: Never       FAMILY HISTORY:   No family history on file.    MEDICATIONS:    Current Outpatient Medications:     metoprolol succinate (TOPROL-XL) 50 MG 24 hr tablet, Take 50 mg by mouth., Disp: , Rfl:     OLANZapine (ZYPREXA) 5 MG tablet, Take 5 mg by mouth once daily., Disp: , Rfl:     sertraline (ZOLOFT) 100 MG tablet, Take by mouth once daily., Disp: , Rfl:     traZODone (DESYREL) 150 MG tablet, Take 300 mg by mouth nightly as needed., Disp: , Rfl:     gabapentin (NEURONTIN) 100 MG capsule, Take 1 capsule (100 mg total) by mouth 3 (three) times daily. (Patient not taking: Reported on 7/19/2022), Disp: 90 capsule, Rfl: 2    pantoprazole (PROTONIX) 40 MG tablet, Take 1 tablet (40 mg total) by mouth once daily. (Patient not taking: Reported on 6/25/2023), Disp: 30 tablet, Rfl: 2    ALLERGIES:   Review of patient's allergies indicates:  No Known Allergies    REVIEW OF SYSTEMS:  Constitution: Negative. Negative for chills, fever and night sweats.   HENT: Negative for congestion and headaches.    Eyes: Negative for blurred vision, left vision loss and right vision loss.   Cardiovascular: Negative for chest pain and syncope.   Respiratory: Negative for cough and shortness of breath.    Endocrine: Negative for polydipsia, polyphagia and polyuria.   Hematologic/Lymphatic: Negative for bleeding problem. Does not bruise/bleed easily.   Skin: Negative for dry skin, itching and rash.   Musculoskeletal: Negative for  falls. Positive for lower extremity pain and muscle weakness.   PHYSICAL EXAM:  VITAL SIGNS: There were no vitals taken for this visit.  General: Well-developed well-nourished 63 y.o. malein no acute distress   Cardiovascular: Regular rhythm by palpation of distal pulse, normal color and temperature, no concerning varicosities on symptomatic side   Lungs: No labored breathing or wheezing appreciated   Neuro: Alert and oriented ×3   Psychiatric: well oriented to person, place and time, demonstrates normal mood and affect   Skin: No rashes, lesions or ulcers, normal temperature, turgor, and texture on uninvolved extremity    Ortho/SPM Exam  Marked tenderness to palpation is present along the distal fibula and ATFL and PT FL ligament complex.  No tenderness over the medial malleolus tear.  There is marked tenderness present along the syndesmosis proximally consistent with high ankle sprain.  No breaks in the skin is neurovascularly intact      Procedures    Orders Placed This Encounter   Procedures    X-Ray Ankle Complete 3 View Left

## 2023-08-23 DIAGNOSIS — S93.492D HIGH ANKLE SPRAIN OF LEFT LOWER EXTREMITY, SUBSEQUENT ENCOUNTER: Primary | ICD-10-CM

## 2024-07-26 ENCOUNTER — HOSPITAL ENCOUNTER (EMERGENCY)
Facility: HOSPITAL | Age: 64
Discharge: LEFT BEFORE DISCHARGE | End: 2024-07-26

## (undated) DEVICE — ENDO POUCH

## (undated) DEVICE — PENCIL HANDSWITCHING ROCKER SW

## (undated) DEVICE — GLOVE SURGICAL PROTEXIS PI BLUE SIZE 8.0

## (undated) DEVICE — TROCAR BLADELESS Z-THREAD 11MM X 100MM

## (undated) DEVICE — GLOVE SURGICAL PROTEXIS PI BLUE SIZE 6.0

## (undated) DEVICE — GLOVE SURGICAL PROTEXIS PI BLUE SIZE 6.5

## (undated) DEVICE — IRRIGATOR SUCTION STYKERFLOW II DISP

## (undated) DEVICE — GLOVE SURGICAL PROTEXIS PI SIZE 8.0

## (undated) DEVICE — CDS LAP CHOLE

## (undated) DEVICE — TROCAR SLEEVE Z-THREAD 5MM X 100MM

## (undated) DEVICE — ADHESIVE LIQUID MASTISOL 2/3CC

## (undated) DEVICE — WARMER SCOPE DISP

## (undated) DEVICE — GLOVE SURGICAL PROTEXIS PI SIZE 6.5

## (undated) DEVICE — GOWN SURGICAL SMARTGOWN LEVEL 4 / EXTRA LARGE STERILE

## (undated) DEVICE — TROCAR BLADELESS Z-THREAD 5MM X 100MM

## (undated) DEVICE — NEEDLE INSUFFLATION DISP 14G X 120

## (undated) DEVICE — GLOVE SURGICAL PROTEXIS PI SIZE 6

## (undated) DEVICE — Device

## (undated) DEVICE — SUTURE MONOCRYL 4-0 27IN

## (undated) DEVICE — INSERT CARTRIDGE 5X34CM DISP F/SCISSOR

## (undated) DEVICE — TAPE STRIPS F/DRAPE 4.5X27IN